# Patient Record
Sex: MALE | Race: WHITE | NOT HISPANIC OR LATINO | Employment: FULL TIME | URBAN - METROPOLITAN AREA
[De-identification: names, ages, dates, MRNs, and addresses within clinical notes are randomized per-mention and may not be internally consistent; named-entity substitution may affect disease eponyms.]

---

## 2017-01-11 ENCOUNTER — TRANSCRIBE ORDERS (OUTPATIENT)
Dept: ADMINISTRATIVE | Facility: HOSPITAL | Age: 32
End: 2017-01-11

## 2017-01-11 DIAGNOSIS — G47.33 OBSTRUCTIVE SLEEP APNEA (ADULT) (PEDIATRIC): Primary | ICD-10-CM

## 2017-01-25 ENCOUNTER — HOSPITAL ENCOUNTER (OUTPATIENT)
Dept: SLEEP CENTER | Facility: CLINIC | Age: 32
Discharge: HOME | End: 2017-01-25
Payer: COMMERCIAL

## 2017-01-25 DIAGNOSIS — G47.33 OBSTRUCTIVE SLEEP APNEA (ADULT) (PEDIATRIC): ICD-10-CM

## 2017-01-25 PROCEDURE — 95810 POLYSOM 6/> YRS 4/> PARAM: CPT

## 2017-01-31 ENCOUNTER — TRANSCRIBE ORDERS (OUTPATIENT)
Dept: SLEEP CENTER | Facility: CLINIC | Age: 32
End: 2017-01-31

## 2017-01-31 DIAGNOSIS — G47.33 OSA (OBSTRUCTIVE SLEEP APNEA): Primary | ICD-10-CM

## 2017-02-17 ENCOUNTER — TRANSCRIBE ORDERS (OUTPATIENT)
Dept: SLEEP CENTER | Facility: CLINIC | Age: 32
End: 2017-02-17

## 2017-02-17 DIAGNOSIS — G47.33 OSA (OBSTRUCTIVE SLEEP APNEA): Primary | ICD-10-CM

## 2017-03-03 ENCOUNTER — GENERIC CONVERSION - ENCOUNTER (OUTPATIENT)
Dept: OTHER | Facility: OTHER | Age: 32
End: 2017-03-03

## 2017-03-06 ENCOUNTER — TRANSCRIBE ORDERS (OUTPATIENT)
Dept: SLEEP CENTER | Facility: CLINIC | Age: 32
End: 2017-03-06

## 2017-03-06 ENCOUNTER — HOSPITAL ENCOUNTER (OUTPATIENT)
Dept: SLEEP CENTER | Facility: CLINIC | Age: 32
Discharge: HOME | End: 2017-03-06
Payer: COMMERCIAL

## 2017-03-06 DIAGNOSIS — G47.33 OBSTRUCTIVE SLEEP APNEA (ADULT) (PEDIATRIC): Primary | ICD-10-CM

## 2017-03-06 DIAGNOSIS — G47.33 OSA (OBSTRUCTIVE SLEEP APNEA): ICD-10-CM

## 2017-05-08 ENCOUNTER — TRANSCRIBE ORDERS (OUTPATIENT)
Dept: SLEEP CENTER | Facility: CLINIC | Age: 32
End: 2017-05-08

## 2017-05-08 ENCOUNTER — HOSPITAL ENCOUNTER (OUTPATIENT)
Dept: SLEEP CENTER | Facility: CLINIC | Age: 32
Discharge: HOME | End: 2017-05-08
Payer: COMMERCIAL

## 2017-05-08 DIAGNOSIS — G47.33 OSA (OBSTRUCTIVE SLEEP APNEA): Primary | ICD-10-CM

## 2017-05-08 DIAGNOSIS — G47.33 OBSTRUCTIVE SLEEP APNEA (ADULT) (PEDIATRIC): ICD-10-CM

## 2017-05-18 ENCOUNTER — ALLSCRIPTS OFFICE VISIT (OUTPATIENT)
Dept: OTHER | Facility: OTHER | Age: 32
End: 2017-05-18

## 2017-05-18 DIAGNOSIS — E78.00 PURE HYPERCHOLESTEROLEMIA: ICD-10-CM

## 2017-05-18 DIAGNOSIS — Z13.1 ENCOUNTER FOR SCREENING FOR DIABETES MELLITUS: ICD-10-CM

## 2017-06-07 ENCOUNTER — GENERIC CONVERSION - ENCOUNTER (OUTPATIENT)
Dept: OTHER | Facility: OTHER | Age: 32
End: 2017-06-07

## 2017-06-07 LAB — AMBIG ABBREV LP DEFAULT (HISTORICAL): NORMAL

## 2017-06-08 LAB
BUN SERPL-MCNC: 13 MG/DL (ref 6–20)
BUN/CREA RATIO (HISTORICAL): 12 (ref 9–20)
CALCIUM SERPL-MCNC: 9.8 MG/DL (ref 8.7–10.2)
CHLORIDE SERPL-SCNC: 96 MMOL/L (ref 96–106)
CHOLEST SERPL-MCNC: 194 MG/DL (ref 100–199)
CO2 SERPL-SCNC: 24 MMOL/L (ref 18–29)
CREAT SERPL-MCNC: 1.07 MG/DL (ref 0.76–1.27)
EGFR AFRICAN AMERICAN (HISTORICAL): 106 ML/MIN/1.73
EGFR-AMERICAN CALC (HISTORICAL): 91 ML/MIN/1.73
GLUCOSE SERPL-MCNC: 95 MG/DL (ref 65–99)
HDLC SERPL-MCNC: 36 MG/DL
LDLC SERPL CALC-MCNC: 101 MG/DL (ref 0–99)
POTASSIUM SERPL-SCNC: 4.9 MMOL/L (ref 3.5–5.2)
SODIUM SERPL-SCNC: 138 MMOL/L (ref 134–144)
TRIGL SERPL-MCNC: 286 MG/DL (ref 0–149)
VLDLC SERPL CALC-MCNC: 57 MG/DL (ref 5–40)

## 2018-01-14 VITALS
DIASTOLIC BLOOD PRESSURE: 76 MMHG | WEIGHT: 236.38 LBS | HEIGHT: 69 IN | OXYGEN SATURATION: 97 % | BODY MASS INDEX: 35.01 KG/M2 | HEART RATE: 78 BPM | SYSTOLIC BLOOD PRESSURE: 124 MMHG | TEMPERATURE: 98.1 F

## 2018-01-26 DIAGNOSIS — F32.A DEPRESSION, UNSPECIFIED DEPRESSION TYPE: Primary | ICD-10-CM

## 2018-01-26 RX ORDER — ESCITALOPRAM OXALATE 10 MG/1
TABLET ORAL
COMMUNITY
Start: 2017-05-18 | End: 2018-01-26 | Stop reason: SDUPTHER

## 2018-01-26 RX ORDER — ESCITALOPRAM OXALATE 10 MG/1
10 TABLET ORAL DAILY
Qty: 90 TABLET | Refills: 1 | Status: SHIPPED | OUTPATIENT
Start: 2018-01-26 | End: 2018-08-19 | Stop reason: SDUPTHER

## 2018-02-06 ENCOUNTER — TELEPHONE (OUTPATIENT)
Dept: FAMILY MEDICINE CLINIC | Facility: CLINIC | Age: 33
End: 2018-02-06

## 2018-02-06 NOTE — TELEPHONE ENCOUNTER
Pharmacy called for clarification on his Lexapro  One daily or Two daily?  Send new rx to [facility]

## 2018-02-06 NOTE — TELEPHONE ENCOUNTER
Lexapro 10 mg po once daily for 1 week then 2 tablets (20 mg) po once daily advise patient and or pharmacy

## 2018-02-08 ENCOUNTER — ANESTHESIA (INPATIENT)
Dept: PERIOP | Facility: HOSPITAL | Age: 33
DRG: 357 | End: 2018-02-08
Payer: COMMERCIAL

## 2018-02-08 ENCOUNTER — ANESTHESIA EVENT (INPATIENT)
Dept: PERIOP | Facility: HOSPITAL | Age: 33
DRG: 357 | End: 2018-02-08
Payer: COMMERCIAL

## 2018-02-08 ENCOUNTER — HOSPITAL ENCOUNTER (INPATIENT)
Facility: HOSPITAL | Age: 33
LOS: 4 days | Discharge: HOME | DRG: 357 | End: 2018-02-12
Attending: EMERGENCY MEDICINE | Admitting: SURGERY
Payer: COMMERCIAL

## 2018-02-08 ENCOUNTER — APPOINTMENT (EMERGENCY)
Dept: CT IMAGING | Facility: HOSPITAL | Age: 33
DRG: 357 | End: 2018-02-08
Payer: COMMERCIAL

## 2018-02-08 DIAGNOSIS — K45.8 INTERNAL HERNIA: ICD-10-CM

## 2018-02-08 DIAGNOSIS — D72.829 LEUKOCYTOSIS: ICD-10-CM

## 2018-02-08 DIAGNOSIS — R10.33 PERIUMBILICAL ABDOMINAL PAIN: Primary | ICD-10-CM

## 2018-02-08 DIAGNOSIS — K56.600 PARTIAL SMALL BOWEL OBSTRUCTION (HCC): ICD-10-CM

## 2018-02-08 DIAGNOSIS — R11.0 NAUSEA: ICD-10-CM

## 2018-02-08 LAB
ALBUMIN SERPL BCP-MCNC: 4.3 G/DL (ref 3.5–5)
ALP SERPL-CCNC: 69 U/L (ref 46–116)
ALT SERPL W P-5'-P-CCNC: 53 U/L (ref 12–78)
ANION GAP SERPL CALCULATED.3IONS-SCNC: 10 MMOL/L (ref 4–13)
AST SERPL W P-5'-P-CCNC: 24 U/L (ref 5–45)
BASOPHILS # BLD AUTO: 0.07 THOUSANDS/ΜL (ref 0–0.1)
BASOPHILS NFR BLD AUTO: 1 % (ref 0–1)
BILIRUB SERPL-MCNC: 0.5 MG/DL (ref 0.2–1)
BILIRUB UR QL STRIP: NEGATIVE
BUN SERPL-MCNC: 14 MG/DL (ref 5–25)
CALCIUM SERPL-MCNC: 10.1 MG/DL (ref 8.3–10.1)
CHLORIDE SERPL-SCNC: 100 MMOL/L (ref 100–108)
CLARITY UR: CLEAR
CO2 SERPL-SCNC: 28 MMOL/L (ref 21–32)
COLOR UR: YELLOW
CREAT SERPL-MCNC: 1.28 MG/DL (ref 0.6–1.3)
EOSINOPHIL # BLD AUTO: 0.16 THOUSAND/ΜL (ref 0–0.61)
EOSINOPHIL NFR BLD AUTO: 1 % (ref 0–6)
ERYTHROCYTE [DISTWIDTH] IN BLOOD BY AUTOMATED COUNT: 12.1 % (ref 11.6–15.1)
GFR SERPL CREATININE-BSD FRML MDRD: 73 ML/MIN/1.73SQ M
GLUCOSE SERPL-MCNC: 112 MG/DL (ref 65–140)
GLUCOSE UR STRIP-MCNC: NEGATIVE MG/DL
HCT VFR BLD AUTO: 51.8 % (ref 36.5–49.3)
HGB BLD-MCNC: 17.5 G/DL (ref 12–17)
HGB UR QL STRIP.AUTO: NEGATIVE
KETONES UR STRIP-MCNC: NEGATIVE MG/DL
LEUKOCYTE ESTERASE UR QL STRIP: NEGATIVE
LIPASE SERPL-CCNC: 76 U/L (ref 73–393)
LYMPHOCYTES # BLD AUTO: 1.44 THOUSANDS/ΜL (ref 0.6–4.47)
LYMPHOCYTES NFR BLD AUTO: 10 % (ref 14–44)
MCH RBC QN AUTO: 28.9 PG (ref 26.8–34.3)
MCHC RBC AUTO-ENTMCNC: 33.8 G/DL (ref 31.4–37.4)
MCV RBC AUTO: 86 FL (ref 82–98)
MONOCYTES # BLD AUTO: 0.69 THOUSAND/ΜL (ref 0.17–1.22)
MONOCYTES NFR BLD AUTO: 5 % (ref 4–12)
NEUTROPHILS # BLD AUTO: 12.19 THOUSANDS/ΜL (ref 1.85–7.62)
NEUTS SEG NFR BLD AUTO: 84 % (ref 43–75)
NITRITE UR QL STRIP: NEGATIVE
NRBC BLD AUTO-RTO: 0 /100 WBCS
PH UR STRIP.AUTO: 6 [PH] (ref 4.5–8)
PLATELET # BLD AUTO: 279 THOUSANDS/UL (ref 149–390)
PMV BLD AUTO: 9.7 FL (ref 8.9–12.7)
POTASSIUM SERPL-SCNC: 4.9 MMOL/L (ref 3.5–5.3)
PROT SERPL-MCNC: 8.7 G/DL (ref 6.4–8.2)
PROT UR STRIP-MCNC: NEGATIVE MG/DL
RBC # BLD AUTO: 6.06 MILLION/UL (ref 3.88–5.62)
SODIUM SERPL-SCNC: 138 MMOL/L (ref 136–145)
SP GR UR STRIP.AUTO: <=1.005 (ref 1–1.03)
UROBILINOGEN UR QL STRIP.AUTO: 0.2 E.U./DL
WBC # BLD AUTO: 14.6 THOUSAND/UL (ref 4.31–10.16)

## 2018-02-08 PROCEDURE — 83690 ASSAY OF LIPASE: CPT | Performed by: EMERGENCY MEDICINE

## 2018-02-08 PROCEDURE — 94660 CPAP INITIATION&MGMT: CPT

## 2018-02-08 PROCEDURE — 80053 COMPREHEN METABOLIC PANEL: CPT | Performed by: EMERGENCY MEDICINE

## 2018-02-08 PROCEDURE — 96361 HYDRATE IV INFUSION ADD-ON: CPT

## 2018-02-08 PROCEDURE — 0WJP0ZZ INSPECTION OF GASTROINTESTINAL TRACT, OPEN APPROACH: ICD-10-PCS | Performed by: SURGERY

## 2018-02-08 PROCEDURE — 94760 N-INVAS EAR/PLS OXIMETRY 1: CPT

## 2018-02-08 PROCEDURE — C9113 INJ PANTOPRAZOLE SODIUM, VIA: HCPCS | Performed by: EMERGENCY MEDICINE

## 2018-02-08 PROCEDURE — 3E0M05Z INTRODUCTION OF ADHESION BARRIER INTO PERITONEAL CAVITY, OPEN APPROACH: ICD-10-PCS | Performed by: SURGERY

## 2018-02-08 PROCEDURE — 96375 TX/PRO/DX INJ NEW DRUG ADDON: CPT

## 2018-02-08 PROCEDURE — 96374 THER/PROPH/DIAG INJ IV PUSH: CPT

## 2018-02-08 PROCEDURE — 99222 1ST HOSP IP/OBS MODERATE 55: CPT | Performed by: SURGERY

## 2018-02-08 PROCEDURE — 99285 EMERGENCY DEPT VISIT HI MDM: CPT

## 2018-02-08 PROCEDURE — 87075 CULTR BACTERIA EXCEPT BLOOD: CPT | Performed by: SURGERY

## 2018-02-08 PROCEDURE — 87205 SMEAR GRAM STAIN: CPT | Performed by: SURGERY

## 2018-02-08 PROCEDURE — 87070 CULTURE OTHR SPECIMN AEROBIC: CPT | Performed by: SURGERY

## 2018-02-08 PROCEDURE — 74177 CT ABD & PELVIS W/CONTRAST: CPT

## 2018-02-08 PROCEDURE — 85025 COMPLETE CBC W/AUTO DIFF WBC: CPT | Performed by: EMERGENCY MEDICINE

## 2018-02-08 PROCEDURE — 49000 EXPLORATION OF ABDOMEN: CPT | Performed by: PHYSICIAN ASSISTANT

## 2018-02-08 PROCEDURE — C1765 ADHESION BARRIER: HCPCS | Performed by: SURGERY

## 2018-02-08 PROCEDURE — 49000 EXPLORATION OF ABDOMEN: CPT | Performed by: SURGERY

## 2018-02-08 PROCEDURE — 36415 COLL VENOUS BLD VENIPUNCTURE: CPT | Performed by: EMERGENCY MEDICINE

## 2018-02-08 PROCEDURE — 81003 URINALYSIS AUTO W/O SCOPE: CPT | Performed by: EMERGENCY MEDICINE

## 2018-02-08 RX ORDER — FENTANYL CITRATE 50 UG/ML
INJECTION, SOLUTION INTRAMUSCULAR; INTRAVENOUS AS NEEDED
Status: DISCONTINUED | OUTPATIENT
Start: 2018-02-08 | End: 2018-02-08 | Stop reason: SURG

## 2018-02-08 RX ORDER — DEXTROSE, SODIUM CHLORIDE, AND POTASSIUM CHLORIDE 5; .9; .15 G/100ML; G/100ML; G/100ML
100 INJECTION INTRAVENOUS CONTINUOUS
Status: DISCONTINUED | OUTPATIENT
Start: 2018-02-08 | End: 2018-02-08

## 2018-02-08 RX ORDER — ROCURONIUM BROMIDE 10 MG/ML
INJECTION, SOLUTION INTRAVENOUS AS NEEDED
Status: DISCONTINUED | OUTPATIENT
Start: 2018-02-08 | End: 2018-02-08 | Stop reason: SURG

## 2018-02-08 RX ORDER — SODIUM CHLORIDE, SODIUM LACTATE, POTASSIUM CHLORIDE, CALCIUM CHLORIDE 600; 310; 30; 20 MG/100ML; MG/100ML; MG/100ML; MG/100ML
50 INJECTION, SOLUTION INTRAVENOUS CONTINUOUS
Status: DISCONTINUED | OUTPATIENT
Start: 2018-02-08 | End: 2018-02-08

## 2018-02-08 RX ORDER — PANTOPRAZOLE SODIUM 40 MG/1
40 INJECTION, POWDER, FOR SOLUTION INTRAVENOUS ONCE
Status: COMPLETED | OUTPATIENT
Start: 2018-02-08 | End: 2018-02-08

## 2018-02-08 RX ORDER — HYDROCODONE BITARTRATE AND ACETAMINOPHEN 5; 325 MG/1; MG/1
1 TABLET ORAL EVERY 4 HOURS PRN
Status: DISCONTINUED | OUTPATIENT
Start: 2018-02-08 | End: 2018-02-12 | Stop reason: HOSPADM

## 2018-02-08 RX ORDER — LIDOCAINE HYDROCHLORIDE 10 MG/ML
INJECTION, SOLUTION INFILTRATION; PERINEURAL AS NEEDED
Status: DISCONTINUED | OUTPATIENT
Start: 2018-02-08 | End: 2018-02-08 | Stop reason: SURG

## 2018-02-08 RX ORDER — FLUTICASONE PROPIONATE 50 MCG
1 SPRAY, SUSPENSION (ML) NASAL DAILY
COMMUNITY
Start: 2016-12-15

## 2018-02-08 RX ORDER — HYDROMORPHONE HYDROCHLORIDE 2 MG/ML
INJECTION, SOLUTION INTRAMUSCULAR; INTRAVENOUS; SUBCUTANEOUS AS NEEDED
Status: DISCONTINUED | OUTPATIENT
Start: 2018-02-08 | End: 2018-02-08 | Stop reason: SURG

## 2018-02-08 RX ORDER — SODIUM CHLORIDE 9 MG/ML
INJECTION, SOLUTION INTRAVENOUS CONTINUOUS PRN
Status: DISCONTINUED | OUTPATIENT
Start: 2018-02-08 | End: 2018-02-08 | Stop reason: SURG

## 2018-02-08 RX ORDER — SUCCINYLCHOLINE CHLORIDE 20 MG/ML
INJECTION INTRAMUSCULAR; INTRAVENOUS AS NEEDED
Status: DISCONTINUED | OUTPATIENT
Start: 2018-02-08 | End: 2018-02-08 | Stop reason: SURG

## 2018-02-08 RX ORDER — DEXMEDETOMIDINE HYDROCHLORIDE 100 UG/ML
INJECTION, SOLUTION INTRAVENOUS AS NEEDED
Status: DISCONTINUED | OUTPATIENT
Start: 2018-02-08 | End: 2018-02-08 | Stop reason: SURG

## 2018-02-08 RX ORDER — ONDANSETRON 2 MG/ML
4 INJECTION INTRAMUSCULAR; INTRAVENOUS ONCE AS NEEDED
Status: DISCONTINUED | OUTPATIENT
Start: 2018-02-08 | End: 2018-02-08 | Stop reason: HOSPADM

## 2018-02-08 RX ORDER — MAGNESIUM HYDROXIDE/ALUMINUM HYDROXICE/SIMETHICONE 120; 1200; 1200 MG/30ML; MG/30ML; MG/30ML
30 SUSPENSION ORAL ONCE
Status: COMPLETED | OUTPATIENT
Start: 2018-02-08 | End: 2018-02-08

## 2018-02-08 RX ORDER — ONDANSETRON 2 MG/ML
4 INJECTION INTRAMUSCULAR; INTRAVENOUS ONCE
Status: COMPLETED | OUTPATIENT
Start: 2018-02-08 | End: 2018-02-08

## 2018-02-08 RX ORDER — FENTANYL CITRATE/PF 50 MCG/ML
25 SYRINGE (ML) INJECTION AS NEEDED
Status: COMPLETED | OUTPATIENT
Start: 2018-02-08 | End: 2018-02-08

## 2018-02-08 RX ORDER — PSEUDOEPHEDRINE HCL 120 MG/1
1 TABLET, FILM COATED, EXTENDED RELEASE ORAL EVERY 12 HOURS PRN
COMMUNITY
Start: 2016-12-15

## 2018-02-08 RX ORDER — PROPOFOL 10 MG/ML
INJECTION, EMULSION INTRAVENOUS AS NEEDED
Status: DISCONTINUED | OUTPATIENT
Start: 2018-02-08 | End: 2018-02-08 | Stop reason: SURG

## 2018-02-08 RX ORDER — SODIUM CHLORIDE, SODIUM LACTATE, POTASSIUM CHLORIDE, CALCIUM CHLORIDE 600; 310; 30; 20 MG/100ML; MG/100ML; MG/100ML; MG/100ML
200 INJECTION, SOLUTION INTRAVENOUS CONTINUOUS
Status: DISCONTINUED | OUTPATIENT
Start: 2018-02-08 | End: 2018-02-08

## 2018-02-08 RX ORDER — AMLODIPINE AND VALSARTAN 5; 320 MG/1; MG/1
1 TABLET ORAL DAILY
COMMUNITY
Start: 2014-11-17 | End: 2018-08-19 | Stop reason: SDUPTHER

## 2018-02-08 RX ORDER — ALBUTEROL SULFATE 2.5 MG/3ML
2.5 SOLUTION RESPIRATORY (INHALATION) ONCE AS NEEDED
Status: DISCONTINUED | OUTPATIENT
Start: 2018-02-08 | End: 2018-02-08 | Stop reason: HOSPADM

## 2018-02-08 RX ORDER — ONDANSETRON 2 MG/ML
INJECTION INTRAMUSCULAR; INTRAVENOUS AS NEEDED
Status: DISCONTINUED | OUTPATIENT
Start: 2018-02-08 | End: 2018-02-08 | Stop reason: SURG

## 2018-02-08 RX ORDER — DEXTROSE AND SODIUM CHLORIDE 5; .45 G/100ML; G/100ML
100 INJECTION, SOLUTION INTRAVENOUS CONTINUOUS
Status: DISCONTINUED | OUTPATIENT
Start: 2018-02-08 | End: 2018-02-10

## 2018-02-08 RX ORDER — ONDANSETRON 2 MG/ML
4 INJECTION INTRAMUSCULAR; INTRAVENOUS EVERY 4 HOURS PRN
Status: DISCONTINUED | OUTPATIENT
Start: 2018-02-08 | End: 2018-02-08 | Stop reason: ALTCHOICE

## 2018-02-08 RX ORDER — METOCLOPRAMIDE HYDROCHLORIDE 5 MG/ML
10 INJECTION INTRAMUSCULAR; INTRAVENOUS ONCE AS NEEDED
Status: DISCONTINUED | OUTPATIENT
Start: 2018-02-08 | End: 2018-02-08 | Stop reason: HOSPADM

## 2018-02-08 RX ORDER — ONDANSETRON 2 MG/ML
4 INJECTION INTRAMUSCULAR; INTRAVENOUS EVERY 6 HOURS PRN
Status: DISCONTINUED | OUTPATIENT
Start: 2018-02-08 | End: 2018-02-12 | Stop reason: HOSPADM

## 2018-02-08 RX ORDER — MIDAZOLAM HYDROCHLORIDE 1 MG/ML
INJECTION INTRAMUSCULAR; INTRAVENOUS AS NEEDED
Status: DISCONTINUED | OUTPATIENT
Start: 2018-02-08 | End: 2018-02-08 | Stop reason: SURG

## 2018-02-08 RX ORDER — ROSUVASTATIN CALCIUM 10 MG/1
1 TABLET, COATED ORAL
COMMUNITY
Start: 2015-05-05

## 2018-02-08 RX ORDER — PROMETHAZINE HYDROCHLORIDE 25 MG/ML
12.5 INJECTION, SOLUTION INTRAMUSCULAR; INTRAVENOUS ONCE AS NEEDED
Status: DISCONTINUED | OUTPATIENT
Start: 2018-02-08 | End: 2018-02-08 | Stop reason: HOSPADM

## 2018-02-08 RX ORDER — SODIUM CHLORIDE 9 MG/ML
125 INJECTION, SOLUTION INTRAVENOUS CONTINUOUS
Status: DISCONTINUED | OUTPATIENT
Start: 2018-02-08 | End: 2018-02-09

## 2018-02-08 RX ADMIN — SUCCINYLCHOLINE CHLORIDE 140 MG: 20 INJECTION, SOLUTION INTRAMUSCULAR; INTRAVENOUS at 13:52

## 2018-02-08 RX ADMIN — HYDROCODONE BITARTRATE AND ACETAMINOPHEN 1 TABLET: 5; 325 TABLET ORAL at 22:13

## 2018-02-08 RX ADMIN — SODIUM CHLORIDE, SODIUM LACTATE, POTASSIUM CHLORIDE, AND CALCIUM CHLORIDE 200 ML/HR: .6; .31; .03; .02 INJECTION, SOLUTION INTRAVENOUS at 12:50

## 2018-02-08 RX ADMIN — ROCURONIUM BROMIDE 30 MG: 10 INJECTION INTRAVENOUS at 13:58

## 2018-02-08 RX ADMIN — CEFAZOLIN SODIUM 2000 MG: 2 SOLUTION INTRAVENOUS at 13:51

## 2018-02-08 RX ADMIN — SODIUM CHLORIDE: 0.9 INJECTION, SOLUTION INTRAVENOUS at 13:57

## 2018-02-08 RX ADMIN — DEXTROSE AND SODIUM CHLORIDE 100 ML/HR: 5; 450 INJECTION, SOLUTION INTRAVENOUS at 16:14

## 2018-02-08 RX ADMIN — DEXMEDETOMIDINE 12 MCG: 100 INJECTION, SOLUTION, CONCENTRATE INTRAVENOUS at 14:00

## 2018-02-08 RX ADMIN — LIDOCAINE HYDROCHLORIDE 10 ML: 20 SOLUTION ORAL; TOPICAL at 09:54

## 2018-02-08 RX ADMIN — HYDROMORPHONE HYDROCHLORIDE 0.6 MG: 2 INJECTION, SOLUTION INTRAMUSCULAR; INTRAVENOUS; SUBCUTANEOUS at 14:12

## 2018-02-08 RX ADMIN — HYDROMORPHONE HYDROCHLORIDE 0.6 MG: 2 INJECTION, SOLUTION INTRAMUSCULAR; INTRAVENOUS; SUBCUTANEOUS at 15:04

## 2018-02-08 RX ADMIN — SODIUM CHLORIDE, SODIUM LACTATE, POTASSIUM CHLORIDE, AND CALCIUM CHLORIDE 200 ML/HR: .6; .31; .03; .02 INJECTION, SOLUTION INTRAVENOUS at 12:41

## 2018-02-08 RX ADMIN — MIDAZOLAM 2 MG: 1 INJECTION INTRAMUSCULAR; INTRAVENOUS at 13:48

## 2018-02-08 RX ADMIN — PROPOFOL 200 MG: 10 INJECTION, EMULSION INTRAVENOUS at 13:52

## 2018-02-08 RX ADMIN — HYDROMORPHONE HYDROCHLORIDE 1 MG: 1 INJECTION, SOLUTION INTRAMUSCULAR; INTRAVENOUS; SUBCUTANEOUS at 10:48

## 2018-02-08 RX ADMIN — SUGAMMADEX 250 MG: 100 INJECTION, SOLUTION INTRAVENOUS at 14:37

## 2018-02-08 RX ADMIN — HYDROMORPHONE HYDROCHLORIDE 0.4 MG: 1 INJECTION, SOLUTION INTRAMUSCULAR; INTRAVENOUS; SUBCUTANEOUS at 15:56

## 2018-02-08 RX ADMIN — METRONIDAZOLE 500 MG: 500 INJECTION, SOLUTION INTRAVENOUS at 13:55

## 2018-02-08 RX ADMIN — PANTOPRAZOLE SODIUM 40 MG: 40 INJECTION, POWDER, FOR SOLUTION INTRAVENOUS at 09:52

## 2018-02-08 RX ADMIN — SODIUM CHLORIDE 1000 ML: 0.9 INJECTION, SOLUTION INTRAVENOUS at 09:50

## 2018-02-08 RX ADMIN — HYDROMORPHONE HYDROCHLORIDE 1 MG: 1 INJECTION, SOLUTION INTRAMUSCULAR; INTRAVENOUS; SUBCUTANEOUS at 19:41

## 2018-02-08 RX ADMIN — HYDROMORPHONE HYDROCHLORIDE 0.4 MG: 2 INJECTION, SOLUTION INTRAMUSCULAR; INTRAVENOUS; SUBCUTANEOUS at 15:09

## 2018-02-08 RX ADMIN — FENTANYL CITRATE 25 MCG: 50 INJECTION INTRAMUSCULAR; INTRAVENOUS at 15:30

## 2018-02-08 RX ADMIN — DEXAMETHASONE SODIUM PHOSPHATE 10 MG: 10 INJECTION INTRAMUSCULAR; INTRAVENOUS at 13:54

## 2018-02-08 RX ADMIN — IOHEXOL 100 ML: 350 INJECTION, SOLUTION INTRAVENOUS at 10:26

## 2018-02-08 RX ADMIN — HYDROMORPHONE HYDROCHLORIDE 0.4 MG: 1 INJECTION, SOLUTION INTRAMUSCULAR; INTRAVENOUS; SUBCUTANEOUS at 16:06

## 2018-02-08 RX ADMIN — DEXMEDETOMIDINE 12 MCG: 100 INJECTION, SOLUTION, CONCENTRATE INTRAVENOUS at 13:52

## 2018-02-08 RX ADMIN — SODIUM CHLORIDE, SODIUM LACTATE, POTASSIUM CHLORIDE, AND CALCIUM CHLORIDE: .6; .31; .03; .02 INJECTION, SOLUTION INTRAVENOUS at 13:45

## 2018-02-08 RX ADMIN — ONDANSETRON 4 MG: 2 INJECTION INTRAMUSCULAR; INTRAVENOUS at 14:16

## 2018-02-08 RX ADMIN — FENTANYL CITRATE 100 MCG: 50 INJECTION, SOLUTION INTRAMUSCULAR; INTRAVENOUS at 13:50

## 2018-02-08 RX ADMIN — SODIUM CHLORIDE 125 ML/HR: 0.9 INJECTION, SOLUTION INTRAVENOUS at 21:22

## 2018-02-08 RX ADMIN — FENTANYL CITRATE 25 MCG: 50 INJECTION INTRAMUSCULAR; INTRAVENOUS at 15:49

## 2018-02-08 RX ADMIN — ONDANSETRON 4 MG: 2 INJECTION INTRAMUSCULAR; INTRAVENOUS at 09:54

## 2018-02-08 RX ADMIN — LIDOCAINE HYDROCHLORIDE 50 MG: 10 INJECTION, SOLUTION INFILTRATION; PERINEURAL at 13:52

## 2018-02-08 RX ADMIN — HYDROMORPHONE HYDROCHLORIDE 1 MG: 1 INJECTION, SOLUTION INTRAMUSCULAR; INTRAVENOUS; SUBCUTANEOUS at 12:51

## 2018-02-08 RX ADMIN — ALUMINUM HYDROXIDE, MAGNESIUM HYDROXIDE, AND SIMETHICONE 30 ML: 200; 200; 20 SUSPENSION ORAL at 09:54

## 2018-02-08 RX ADMIN — FENTANYL CITRATE 25 MCG: 50 INJECTION INTRAMUSCULAR; INTRAVENOUS at 15:35

## 2018-02-08 RX ADMIN — FENTANYL CITRATE 25 MCG: 50 INJECTION INTRAMUSCULAR; INTRAVENOUS at 15:43

## 2018-02-08 NOTE — ANESTHESIA PREPROCEDURE EVALUATION
Review of Systems/Medical History  Patient summary reviewed  Chart reviewed  No history of anesthetic complications     Cardiovascular  EKG reviewed, Exercise tolerance: good,  Hyperlipidemia, Hypertension ,    Pulmonary  Negative pulmonary ROS        GI/Hepatic      Comment: Partial small bowel obstruction        IMPRESSION:     Findings most consistent with early or partial small bowel obstruction with sharp transition from moderately distended to collapsed loops to the left of midline in the mid to lower abdomen  The obstruction appears to be on the basis of internal hernia  Small volume of reactive interloop ascites and free pelvic ascites with no evidence of abscess or clement peritoneal air    Negative  ROS        Endo/Other  Negative endo/other ROS      GYN  Negative gynecology ROS          Hematology  Negative hematology ROS      Musculoskeletal  Negative musculoskeletal ROS        Neurology  Negative neurology ROS      Psychology   Negative psychology ROS              Physical Exam    Airway    Mallampati score: II  TM Distance: >3 FB  Neck ROM: full     Dental   No notable dental hx     Cardiovascular  Rhythm: regular, Rate: normal, Cardiovascular exam normal    Pulmonary  Pulmonary exam normal Breath sounds clear to auscultation,     Other Findings        Anesthesia Plan  ASA Score- 2 Emergent    Anesthesia Type- general with ASA Monitors  Additional Monitors:   Airway Plan: ETT  Plan Factors-Patient not instructed to abstain from smoking on day of procedure  Patient smoked on day of surgery  Induction- intravenous and rapid sequence induction  Postoperative Plan- Plan for postoperative opioid use  Planned trial extubation    Informed Consent- Anesthetic plan and risks discussed with patient  I personally reviewed this patient with the CRNA  Discussed and agreed on the Anesthesia Plan with the CRNA             Lab Results   Component Value Date    WBC 14 60 (H) 02/08/2018    HGB 17 5 (H) 02/08/2018    HCT 51 8 (H) 02/08/2018    MCV 86 02/08/2018     02/08/2018     Lab Results   Component Value Date    GLUCOSE 112 02/08/2018    CALCIUM 10 1 02/08/2018     02/08/2018    K 4 9 02/08/2018    CO2 28 02/08/2018     02/08/2018    BUN 14 02/08/2018    CREATININE 1 28 02/08/2018     No results found for: INR, PROTIME  No results found for: PTT          I, Dr Zee Allen, the attending physician, have personally seen and evaluated the patient prior to anesthetic care  I have reviewed the pre-anesthetic record, and other medical records if appropriate to the anesthetic care  If a CRNA is involved in the case, I have reviewed the CRNA assessment, if present, and agree  The patient is in a suitable condition to proceed with my formulated anesthetic plan

## 2018-02-08 NOTE — H&P
H&P Exam - General Surgery   Jackie Saucedo 34 y o  male MRN: [id number]  Unit/Bed#: ED 12 Encounter: [id number]    Assessment/Plan     Assessment:  Abdominal pain with SBO and leucocytosis      Plan:  IVF with antibiotic coverage  Pain relief  Exploratory laparoscopy    History of Present Illness     HPI:  Jackie Saucedo is a 34 y o  male who presents with 24 hr HX of abdominal pain and some chills, no emesis, no previous SX as such, and no previous abdominal surgery  Last BM last night, no diarrhea, NPO since last night  Pain is diffusely periumbilical and deep with feelings of pressure and sharp pain  PMH remarkable for no abdominal surgery, HTN hyperlipidemia, and depression  CT reveals upper/mid SBO suspect for internal hernia  WBC 14 6K  Review of Systems   Constitutional: Positive for chills  Negative for appetite change, fatigue, fever and unexpected weight change  HENT: Negative  Eyes: Negative  Respiratory: Negative  Cardiovascular:        HTN   Gastrointestinal: Negative  Endocrine: Negative  Genitourinary: Negative  Musculoskeletal: Negative  Allergic/Immunologic: Negative  Neurological: Negative  Psychiatric/Behavioral:        Treated for mild depression   All other systems reviewed and are negative        Historical Information   Past Medical History:   Diagnosis Date    Hypertension      Past Surgical History:   Procedure Laterality Date    LEG SURGERY       Social History   History   Alcohol Use    Yes     Comment: Social     History   Drug Use No     History   Smoking Status    Never Smoker   Smokeless Tobacco    Never Used     Family History: non-contributory    Meds/Allergies   all medications and allergies reviewed  No Known Allergies    Objective   First Vitals:   Blood Pressure: 140/95 (02/08/18 0940)  Pulse: 95 (02/08/18 0940)  Temperature: 99 °F (37 2 °C) (02/08/18 0940)  Temp Source: Oral (02/08/18 0940)  Respirations: 16 (02/08/18 0940)  Height: 5' 9" (175 3 cm) (02/08/18 0940)  Weight - Scale: 112 kg (246 lb 14 6 oz) (02/08/18 0940)  SpO2: 98 % (02/08/18 0940)    Current Vitals:   Blood Pressure: 142/90 (02/08/18 1152)  Pulse: 91 (02/08/18 1152)  Temperature: 99 °F (37 2 °C) (02/08/18 0940)  Temp Source: Oral (02/08/18 0940)  Respirations: 16 (02/08/18 1152)  Height: 5' 9" (175 3 cm) (02/08/18 0940)  Weight - Scale: 112 kg (246 lb 14 6 oz) (02/08/18 0940)  SpO2: 98 % (02/08/18 1152)      Intake/Output Summary (Last 24 hours) at 02/08/18 1155  Last data filed at 02/08/18 1050   Gross per 24 hour   Intake             2000 ml   Output                0 ml   Net             2000 ml       Invasive Devices     Peripheral Intravenous Line            Peripheral IV 02/08/18 Left Antecubital less than 1 day                Physical Exam   Constitutional: He is oriented to person, place, and time  He appears well-developed and well-nourished  HENT:   Head: Normocephalic and atraumatic  Mouth/Throat: Oropharynx is clear and moist    Eyes: Conjunctivae and EOM are normal  Pupils are equal, round, and reactive to light  Neck: Normal range of motion  Neck supple  No JVD present  No tracheal deviation present  No thyromegaly present  Cardiovascular: Normal rate, regular rhythm and normal heart sounds  Pulmonary/Chest: [facility] normal and breath sounds normal  No respiratory distress  He has no wheezes  He has no rales  Abdominal:   Moderately distended, soft, diffusely tender in nonspecific manner through mid abdomen, BS are 2/min and of obstructive quality, no herniations identified   Musculoskeletal: Normal range of motion  He exhibits no edema, tenderness or deformity  Neurological: He is alert and oriented to person, place, and time  No cranial nerve deficit  Skin: Skin is warm and dry  No rash noted  No erythema  Psychiatric: He has a normal mood and affect   His behavior is normal  Judgment and thought content normal        Lab Results:   CBC:   Lab Results   Component Value Date    WBC 14 60 (H) 02/08/2018    HGB 17 5 (H) 02/08/2018    HCT 51 8 (H) 02/08/2018    MCV 86 02/08/2018     02/08/2018    MCH 28 9 02/08/2018    MCHC 33 8 02/08/2018    RDW 12 1 02/08/2018    MPV 9 7 02/08/2018    NRBC 0 02/08/2018   , CMP:   Lab Results   Component Value Date     02/08/2018    K 4 9 02/08/2018     02/08/2018    CO2 28 02/08/2018    ANIONGAP 10 02/08/2018    BUN 14 02/08/2018    CREATININE 1 28 02/08/2018    GLUCOSE 112 02/08/2018    CALCIUM 10 1 02/08/2018    AST 24 02/08/2018    ALT 53 02/08/2018    ALKPHOS 69 02/08/2018    PROT 8 7 (H) 02/08/2018    BILITOT 0 50 02/08/2018    EGFR 73 02/08/2018     Imaging: I have personally reviewed pertinent reports  and I have personally reviewed pertinent films in PACS  EKG, Pathology, and Other Studies: I have personally reviewed pertinent reports  Code Status: No Order  Advance Directive and Living Will:      Power of :    POLST:      Counseling / Coordination of Care  Total floor / unit time spent today 40 minutes  Greater than 50% of total time was spent with the patient and / or family counseling and / or coordination of care  A description of the counseling / coordination of care: explanation of pathology and treatment plan, patient understands and agrees

## 2018-02-08 NOTE — ED PROVIDER NOTES
History  Chief Complaint   Patient presents with    Abdominal Pain     Pt stated that he started to have severe abdominal pain since yesterday  C/o of chills, nausea, and constipation     Patient is a 27-year-old male with past medical history of hypertension, hyperlipidemia and depression, presents to the emergency department by ambulance for periumbilical abdominal pain  Patient states yesterday he was feeling mild central lower abdominal pain that waxed and waned however this morning, the pain migrated up towards his umbilicus and has been more severe and constant  He states it waxes and wanes in severity but is always there  It is sharp and he feels like something is inside his abdomen trying to push things out  He states when he ate yesterday, he initially had some relief of the pain but then the pain worsened again  He denies any other alleviating or aggravating factors  Denies feeling this type of pain in the past   He has had nausea but no episodes of vomiting  He reports not having a bowel movement this morning but did have a normal bowel movement yesterday  He reports chills but no documented fever  Denies any other associated symptoms  No fever, headache, dizziness or near syncope, URI symptoms, cough, neck or back pain, chest pain, palpitations, shortness of breath, vomiting, diarrhea, constipation, bright red blood per rectum, melena, dysuria, frequency, hematuria, flank pain, testicular pain or swelling, penile discharge, skin rash or color change, extremity weakness or paresthesia or other focal neurologic deficits  He does report history of peptic ulcer disease but states when he would have pain associated with that it was in a different location and was not this severe  He admits to taking Aleve on occasion but does not use NSAIDs daily  At most he states he will take it once a week  He denies any recent sick contacts or recent travel outside the country    Denies any recent alcohol use or any history of pancreatitis in the past   Denies prior abdominal surgical history  History provided by:  Patient   used: No    Abdominal Pain   Pain location:  Periumbilical  Pain quality: pressure and sharp    Pain radiates to:  Does not radiate  Pain severity:  Severe  Onset quality:  Gradual  Duration:  2 days  Timing:  Constant  Progression:  Worsening  Chronicity:  New  Context: not alcohol use, not diet changes, not laxative use, not previous surgeries, not recent illness, not recent travel, not sick contacts, not suspicious food intake and not trauma    Relieved by:  Nothing  Worsened by:  Nothing  Ineffective treatments:  None tried  Associated symptoms: chills and nausea    Associated symptoms: no chest pain, no constipation, no cough, no diarrhea, no dysuria, no fatigue, no fever, no hematuria, no shortness of breath, no sore throat and no vomiting    Risk factors: NSAID use    Risk factors: no alcohol abuse and has not had multiple surgeries        Prior to Admission Medications   Prescriptions Last Dose Informant Patient Reported? Taking?    Cetirizine HCl (ZYRTEC ALLERGY) 10 MG CAPS   Yes Yes   Sig: Take 1 tablet by mouth daily   MULTIPLE VITAMINS PO   Yes Yes   Sig: Take by mouth   amLODIPine-valsartan (EXFORGE) 5-320 MG per tablet   Yes Yes   Sig: Take 1 tablet by mouth daily   escitalopram (LEXAPRO) 10 mg tablet   No No   Sig: Take 1 tablet (10 mg total) by mouth daily 2 tablets daily   fluticasone (FLONASE) 50 mcg/act nasal spray   Yes Yes   Si spray into each nostril daily   pseudoephedrine (SUDAFED) 120 MG 12 hr tablet   Yes Yes   Sig: Take 1 tablet by mouth every 12 (twelve) hours as needed   rosuvastatin (CRESTOR) 10 MG tablet   Yes Yes   Sig: Take 1 tablet by mouth      Facility-Administered Medications: None       Past Medical History:   Diagnosis Date    Depression     Hyperlipidemia     Hypertension        Past Surgical History:   Procedure Laterality Date  LEG SURGERY         History reviewed  No pertinent family history  I have reviewed and agree with the history as documented  Social History   Substance Use Topics    Smoking status: Never Smoker    Smokeless tobacco: Former User    Alcohol use Yes      Comment: Social        Review of Systems   Constitutional: Positive for chills  Negative for appetite change, diaphoresis, fatigue and fever  HENT: Negative for congestion, ear pain, rhinorrhea and sore throat  Eyes: Negative for photophobia, pain and visual disturbance  Respiratory: Negative for cough, chest tightness, shortness of breath and wheezing  Cardiovascular: Negative for chest pain and palpitations  Gastrointestinal: Positive for abdominal pain and nausea  Negative for constipation, diarrhea and vomiting  Genitourinary: Negative for discharge, dysuria, flank pain, frequency, hematuria, scrotal swelling and testicular pain  Musculoskeletal: Negative for back pain, neck pain and neck stiffness  Skin: Negative for color change, pallor, rash and wound  Allergic/Immunologic: Negative for immunocompromised state  Neurological: Negative for dizziness, syncope, weakness, light-headedness, numbness and headaches  Hematological: Negative for adenopathy  Psychiatric/Behavioral: Negative for confusion, decreased concentration and sleep disturbance  All other systems reviewed and are negative        Physical Exam  ED Triage Vitals [02/08/18 0940]   Temperature Pulse Respirations Blood Pressure SpO2   99 °F (37 2 °C) 95 16 140/95 98 %      Temp Source Heart Rate Source Patient Position - Orthostatic VS BP Location FiO2 (%)   Oral Monitor Lying Right arm --      Pain Score       8         Vitals:    02/08/18 1606 02/08/18 1615 02/08/18 1645 02/08/18 1715   BP:  132/71 136/82 157/80   BP Location:   Right arm Right arm   Pulse: 76 83 78 74   Resp: 14 13 15 16   Temp:   98 3 °F (36 8 °C) 98 5 °F (36 9 °C)   TempSrc:   Oral Oral   SpO2: 96% 95% 95% 95%   Weight:       Height:         Physical Exam   Constitutional: He is oriented to person, place, and time  He appears well-developed and well-nourished  No distress  Patient is in no distress but appears uncomfortable secondary to pain  HENT:   Head: Normocephalic and atraumatic  Mouth/Throat: No oropharyngeal exudate  Dry mucous membranes  Eyes: Conjunctivae and EOM are normal  Pupils are equal, round, and reactive to light  Neck: Normal range of motion  Neck supple  No JVD present  Cardiovascular: Normal rate, regular rhythm, normal heart sounds and intact distal pulses  Exam reveals no gallop and no friction rub  No murmur heard  Pulmonary/Chest: [place] normal and breath sounds normal  No respiratory distress  He has no wheezes  He has no rales  He exhibits no tenderness  Abdominal: Soft  Bowel sounds are normal  He exhibits no distension  There is tenderness  There is no rebound and no guarding  Diffuse abdominal tenderness  Most significant tenderness in the right upper and left upper quadrants  No tenderness at McBurney's point  Musculoskeletal: Normal range of motion  He exhibits no edema or tenderness  Neurological: He is alert and oriented to person, place, and time  No gross motor or sensory deficits  Skin: Skin is warm and dry  No rash noted  He is not diaphoretic  No pallor  Psychiatric: He has a normal mood and affect  His behavior is normal    Nursing note and vitals reviewed        ED Medications  Medications   sodium chloride 0 9 % infusion (not administered)   HYDROmorphone (DILAUDID) injection 1 mg ( Intravenous MAR Unhold 2/8/18 1509)   ondansetron (ZOFRAN) injection 4 mg ( Intravenous MAR Unhold 2/8/18 1509)   enoxaparin (LOVENOX) subcutaneous injection 40 mg ( Subcutaneous MAR Unhold 2/8/18 1509)   HYDROcodone-acetaminophen (NORCO) 5-325 mg per tablet 1 tablet (not administered)   dextrose 5 % and sodium chloride 0 45 % infusion (100 mL/hr Intravenous New Bag 2/8/18 1614)   sodium chloride 0 9 % bolus 1,000 mL (0 mL Intravenous Stopped 2/8/18 1050)   ondansetron (ZOFRAN) injection 4 mg (4 mg Intravenous Given 2/8/18 0954)   aluminum-magnesium hydroxide-simethicone (MYLANTA) 200-200-20 mg/5 mL oral suspension 30 mL (30 mL Oral Given 2/8/18 0954)   pantoprazole (PROTONIX) injection 40 mg (40 mg Intravenous Given 2/8/18 0952)   lidocaine viscous (XYLOCAINE) 2 % mucosal solution 10 mL (10 mL Swish & Swallow Given 2/8/18 0954)   iohexol (OMNIPAQUE) 350 MG/ML injection (MULTI-DOSE) 100 mL (100 mL Intravenous Given 2/8/18 1026)   HYDROmorphone (DILAUDID) injection 1 mg (1 mg Intravenous Given 2/8/18 1048)   fentaNYL (SUBLIMAZE) injection 25 mcg (25 mcg Intravenous Given 2/8/18 1549)       Diagnostic Studies  Results Reviewed     Procedure Component Value Units Date/Time    UA w Reflex to Microscopic [[id number]]  (Normal) Collected:  02/08/18 1252    Lab Status:  Final result Specimen:  Urine from Urine, Clean Catch Updated:  02/08/18 1334     Color, UA Yellow     Clarity, UA Clear     Specific Gravity, UA <=1 005     pH, UA 6 0     Leukocytes, UA Negative     Nitrite, UA Negative     Protein, UA Negative mg/dl      Glucose, UA Negative mg/dl      Ketones, UA Negative mg/dl      Urobilinogen, UA 0 2 E U /dl      Bilirubin, UA Negative     Blood, UA Negative    Comprehensive metabolic panel [[id number]]  (Abnormal) Collected:  02/08/18 0949    Lab Status:  Final result Specimen:  Blood from Arm, Left Updated:  02/08/18 1014     Sodium 138 mmol/L      Potassium 4 9 mmol/L      Chloride 100 mmol/L      CO2 28 mmol/L      Anion Gap 10 mmol/L      BUN 14 mg/dL      Creatinine 1 28 mg/dL      Glucose 112 mg/dL      Calcium 10 1 mg/dL      AST 24 U/L      ALT 53 U/L      Alkaline Phosphatase 69 U/L      Total Protein 8 7 (H) g/dL      Albumin 4 3 g/dL      Total Bilirubin 0 50 mg/dL      eGFR 73 ml/min/1 73sq m     Narrative:         National Kidney Disease Education Program recommendations are as follows:  GFR calculation is accurate only with a steady state creatinine  Chronic Kidney disease less than 60 ml/min/1 73 sq  meters  Kidney failure less than 15 ml/min/1 73 sq  meters  Lipase [[id number]]  (Normal) Collected:  02/08/18 0949    Lab Status:  Final result Specimen:  Blood from Arm, Left Updated:  02/08/18 1014     Lipase 76 u/L     CBC and differential [[id number]]  (Abnormal) Collected:  02/08/18 0949    Lab Status:  Final result Specimen:  Blood from Arm, Left Updated:  02/08/18 0955     WBC 14 60 (H) Thousand/uL      RBC 6 06 (H) Million/uL      Hemoglobin 17 5 (H) g/dL      Hematocrit 51 8 (H) %      MCV 86 fL      MCH 28 9 pg      MCHC 33 8 g/dL      RDW 12 1 %      MPV 9 7 fL      Platelets 324 Thousands/uL      nRBC 0 /100 WBCs      Neutrophils Relative 84 (H) %      Lymphocytes Relative 10 (L) %      Monocytes Relative 5 %      Eosinophils Relative 1 %      Basophils Relative 1 %      Neutrophils Absolute 12 19 (H) Thousands/µL      Lymphocytes Absolute 1 44 Thousands/µL      Monocytes Absolute 0 69 Thousand/µL      Eosinophils Absolute 0 16 Thousand/µL      Basophils Absolute 0 07 Thousands/µL                  CT abdomen pelvis with contrast   Final Result by Linda Prince MD (02/08 1040)      Findings most consistent with early or partial small bowel obstruction with sharp transition from moderately distended to collapsed loops to the left of midline in the mid to lower abdomen  The obstruction appears to be on the basis of internal hernia  Small volume of reactive interloop ascites and free pelvic ascites with no evidence of abscess or clement peritoneal air  Workstation performed: [id number]                    Procedures  Procedures       Phone Contacts  ED Phone Contact    ED Course  ED Course as of Feb 08 1816   Thu Feb 08, 2018   1042 Patient reassessed and states he has had no improvement in pain since the GI cocktail    Will avoid NSAIDs due to history of peptic ulcer disease and give 1 mg of IV Dilaudid  Still awaiting CT scan read  1115 General surgery at bedside  MDM  Number of Diagnoses or Management Options  Diagnosis management comments: 59-year-old male presenting with 2 days of progressively worsening central abdominal pain and nausea  Differential includes exacerbation of peptic ulcer disease, gastritis, enteritis, colitis, biliary colic or cholecystitis, appendicitis, diverticulitis, pancreatitis, hepatitis, renal colic however less likely given the location of pain and absence of urinary symptoms  Will check abdominal labs, urinalysis and obtain a CT scan of the abdomen and pelvis  Will treat with Zofran, GI cocktail, Protonix and IV fluid bolus         Amount and/or Complexity of Data Reviewed  Clinical lab tests: ordered and reviewed  Tests in the radiology section of CPT®: reviewed and ordered  Independent visualization of images, tracings, or specimens: yes      CritCare Time    Disposition  Final diagnoses:   Periumbilical abdominal pain   Partial small bowel obstruction   Internal hernia   Nausea   Leukocytosis     Time reflects when diagnosis was documented in both MDM as applicable and the Disposition within this note     Time User Action Codes Description Comment    2/8/2018 10:53 AM Onnie Colace E Add [Q58 06] Periumbilical abdominal pain     2/8/2018 10:53 AM Onnie Colace E Add [K56 600] Partial small bowel obstruction     2/8/2018 10:53 AM Onnie Colace E Add [K45 8] Internal hernia     2/8/2018 10:53 AM Onnie Colace E Add [R11 0] Nausea     2/8/2018 10:53 AM Onnie Colace E Add [D72 829] Leukocytosis     2/8/2018  2:09 PM Manan Cruz Modify [K56 600] Partial small bowel obstruction     2/8/2018  2:09 PM Yani Hinds Modify [K45 8] Internal hernia     2/8/2018  2:09 PM Manan Cruz Modify [D72 829] Leukocytosis     2/8/2018  3:11 PM Chavo Cuellar Modify [W67 385] Partial small bowel obstruction     2/8/2018  3:11 PM Cynthea Filler Modify [R29 3] Internal hernia     2/8/2018  3:11 PM Cynthea Filler Modify [V96 733] Leukocytosis       ED Disposition     ED Disposition Condition Comment    Admit  Case was discussed with Dr Rosa Singleton and the patient's admission status was agreed to be Admission Status: inpatient status to the service of Dr Rosa Singleton   Follow-up Information    None       Current Discharge Medication List      CONTINUE these medications which have NOT CHANGED    Details   amLODIPine-valsartan (EXFORGE) 5-320 MG per tablet Take 1 tablet by mouth daily      Cetirizine HCl (ZYRTEC ALLERGY) 10 MG CAPS Take 1 tablet by mouth daily      fluticasone (FLONASE) 50 mcg/act nasal spray 1 spray into each nostril daily      MULTIPLE VITAMINS PO Take by mouth      pseudoephedrine (SUDAFED) 120 MG 12 hr tablet Take 1 tablet by mouth every 12 (twelve) hours as needed      rosuvastatin (CRESTOR) 10 MG tablet Take 1 tablet by mouth      escitalopram (LEXAPRO) 10 mg tablet Take 1 tablet (10 mg total) by mouth daily 2 tablets daily  Qty: 90 tablet, Refills: 1    Associated Diagnoses: Depression, unspecified depression type           No discharge procedures on file      ED Provider  Electronically Signed by           Caryn Bautista DO  02/08/18 5717

## 2018-02-08 NOTE — ANESTHESIA POSTPROCEDURE EVALUATION
Post-Op Assessment Note      CV Status:  Stable    Mental Status:  Alert and awake    Hydration Status:  Stable    PONV Controlled:  None    Airway Patency:  Patent and adequate    Post Op Vitals Reviewed: Yes          Staff: Anesthesiologist, CRNA           BP   158/94   Temp   99 1   Pulse  85   Resp  18   SpO2   99%

## 2018-02-08 NOTE — ED NOTES
Attempted to call report to [facility]  RN unable to take at this time  Will try again at a later time        Bethany Villalba RN  02/08/18 3055

## 2018-02-08 NOTE — OP NOTE
OPERATIVE REPORT  PATIENT NAME: Corin Negron    :  1985  MRN: [id number]  Pt Location: [facility] OR ROOM 03    SURGERY DATE: 2018    Surgeon(s) and Role:     Desirae Gurrola MD - Primary     * Shantell Snyder PA-C - Assisting    Preop Diagnosis:  Leukocytosis [D72 829]  Internal hernia [K45 8]  Partial small bowel obstruction [K56 600]    Post-Op Diagnosis Codes:     * Leukocytosis [D72 829]     * Internal hernia [K45 8]     * Partial small bowel obstruction [K56 600]    Procedure(s) (LRB):  EXPLORATORY LAPAROTOMY (N/A)    Specimen(s):  ID Type Source Tests Collected by Time Destination   A : peritoneal fluid Body Fluid Peritoneal Fluid ANAEROBIC CULTURE AND GRAM STAIN, BODY FLUID CULTURE AND GRAM STAIN Latia De Leon MD 2018 1408        Estimated Blood Loss:   Minimal    Drains:       Anesthesia Type:   General    Operative Indications:  Leukocytosis [D72 829]  Internal hernia [K45 8]  Partial small bowel obstruction [V06656]  19-year-old male presented to the emergency room with 2 day history of increasing mid abdominal pain associated with nausea without vomiting  The pain was 8/10 in intensity  The patient came into the emergency room in the white cell count was elevated to 15,000, CT scan of the abdomen and pelvis showed findings most consistent with early or partial small bowel obstruction which sharp transition with moderately distended to collapsed loops to the left of midline in the mid to lower abdomen  The obstruction appears to be on the basis of internal hernia  Small volume of reacted interloop abscess site ease and free pelvic ascites with no evidence of abscess or clement peritoneal air  The patient was advised to undergo exploratory laparotomy    Operative Findings: There was moderate amount of ascitic fluid, clear in color    The small bowel was run from the ligament of Treitz down to the terminal ileum in the only positive finding was a jejunal diverticulum with white mild without any contents  The rest of the abdominal cavity showed no evidence of inflammatory neoplastic process  There was no evidence of internal hernia or strangulated bowel  Bowel was completely viable  Liver surface appeared normal     Complications:   None    Procedure and Technique:  The patient was identified in the patient was placed in the operating table in a supine position  After adequate anesthesia induction and satisfactory endotracheal intubation the abdomen was prepped and draped in a sterile usual fashion with ChloraPrep  Time-out was called the patient was identified as was surgical site  Midline incision was made with a scalpel, taken down through the subcutaneous tissue with cautery  The fascia peritoneum were opened with cautery  There was moderate amount of ascitic fluid, cultures were taken  Once we entered the abdominal cavity and exploratory laparotomy was performed with above findings  The large bowel was palpated with no evidence of inflammatory or neoplastic process  Liver surface and gallbladder appear normal  The small bowel was run twice with no evidence of internal hernia, strangulation or ischemia  Interceed was placed to prevent adhesions  At this point proceeded to close the fascia with 1 looped PDS in a continuous fashion  The subcutaneous tissue was copiously irrigated with sterile solution  The subcutaneous tissue was approximated with a 3 0 Vicryl in an interrupted fashion and the skin was closed with 4 O Vicryl in a continuous subcuticular fashion  Sterile dressing were applied  At the end of the case instrument, needles, and sponges counts were correct  The patient tolerated the procedure well     I was present for the entire procedure, A qualified resident physician was not available and A physician assistant was required during the procedure for retraction tissue handling,dissection and suturing    Patient Disposition:  PACU     SIGNATURE: Danny Blakc MD  DATE: February 8, 2018  TIME: 2:29 PM

## 2018-02-09 LAB
ANION GAP SERPL CALCULATED.3IONS-SCNC: 8 MMOL/L (ref 4–13)
BUN SERPL-MCNC: 13 MG/DL (ref 5–25)
CALCIUM SERPL-MCNC: 8.4 MG/DL (ref 8.3–10.1)
CHLORIDE SERPL-SCNC: 100 MMOL/L (ref 100–108)
CO2 SERPL-SCNC: 29 MMOL/L (ref 21–32)
CREAT SERPL-MCNC: 1.04 MG/DL (ref 0.6–1.3)
ERYTHROCYTE [DISTWIDTH] IN BLOOD BY AUTOMATED COUNT: 12.2 % (ref 11.6–15.1)
GFR SERPL CREATININE-BSD FRML MDRD: 94 ML/MIN/1.73SQ M
GLUCOSE SERPL-MCNC: 146 MG/DL (ref 65–140)
HCT VFR BLD AUTO: 44.4 % (ref 36.5–49.3)
HGB BLD-MCNC: 15.1 G/DL (ref 12–17)
MCH RBC QN AUTO: 29 PG (ref 26.8–34.3)
MCHC RBC AUTO-ENTMCNC: 34 G/DL (ref 31.4–37.4)
MCV RBC AUTO: 85 FL (ref 82–98)
PLATELET # BLD AUTO: 238 THOUSANDS/UL (ref 149–390)
PMV BLD AUTO: 9.7 FL (ref 8.9–12.7)
POTASSIUM SERPL-SCNC: 4.1 MMOL/L (ref 3.5–5.3)
RBC # BLD AUTO: 5.2 MILLION/UL (ref 3.88–5.62)
SODIUM SERPL-SCNC: 137 MMOL/L (ref 136–145)
WBC # BLD AUTO: 13.84 THOUSAND/UL (ref 4.31–10.16)

## 2018-02-09 PROCEDURE — 94760 N-INVAS EAR/PLS OXIMETRY 1: CPT

## 2018-02-09 PROCEDURE — 80048 BASIC METABOLIC PNL TOTAL CA: CPT | Performed by: PHYSICIAN ASSISTANT

## 2018-02-09 PROCEDURE — 99024 POSTOP FOLLOW-UP VISIT: CPT | Performed by: PHYSICIAN ASSISTANT

## 2018-02-09 PROCEDURE — 85027 COMPLETE CBC AUTOMATED: CPT | Performed by: PHYSICIAN ASSISTANT

## 2018-02-09 RX ORDER — KETOROLAC TROMETHAMINE 30 MG/ML
30 INJECTION, SOLUTION INTRAMUSCULAR; INTRAVENOUS EVERY 6 HOURS PRN
Status: DISPENSED | OUTPATIENT
Start: 2018-02-09 | End: 2018-02-11

## 2018-02-09 RX ADMIN — HYDROMORPHONE HYDROCHLORIDE 1 MG: 1 INJECTION, SOLUTION INTRAMUSCULAR; INTRAVENOUS; SUBCUTANEOUS at 05:58

## 2018-02-09 RX ADMIN — HYDROMORPHONE HYDROCHLORIDE 1 MG: 1 INJECTION, SOLUTION INTRAMUSCULAR; INTRAVENOUS; SUBCUTANEOUS at 10:17

## 2018-02-09 RX ADMIN — HYDROCODONE BITARTRATE AND ACETAMINOPHEN 1 TABLET: 5; 325 TABLET ORAL at 04:53

## 2018-02-09 RX ADMIN — HYDROCODONE BITARTRATE AND ACETAMINOPHEN 1 TABLET: 5; 325 TABLET ORAL at 08:41

## 2018-02-09 RX ADMIN — KETOROLAC TROMETHAMINE 30 MG: 30 INJECTION, SOLUTION INTRAMUSCULAR at 21:45

## 2018-02-09 RX ADMIN — HYDROMORPHONE HYDROCHLORIDE 1 MG: 1 INJECTION, SOLUTION INTRAMUSCULAR; INTRAVENOUS; SUBCUTANEOUS at 18:50

## 2018-02-09 RX ADMIN — HYDROMORPHONE HYDROCHLORIDE 1 MG: 1 INJECTION, SOLUTION INTRAMUSCULAR; INTRAVENOUS; SUBCUTANEOUS at 01:28

## 2018-02-09 RX ADMIN — DEXTROSE AND SODIUM CHLORIDE 100 ML/HR: 5; 450 INJECTION, SOLUTION INTRAVENOUS at 15:28

## 2018-02-09 RX ADMIN — ENOXAPARIN SODIUM 40 MG: 40 INJECTION SUBCUTANEOUS at 08:41

## 2018-02-09 RX ADMIN — HYDROMORPHONE HYDROCHLORIDE 1 MG: 1 INJECTION, SOLUTION INTRAMUSCULAR; INTRAVENOUS; SUBCUTANEOUS at 15:27

## 2018-02-09 RX ADMIN — HYDROCODONE BITARTRATE AND ACETAMINOPHEN 1 TABLET: 5; 325 TABLET ORAL at 13:57

## 2018-02-09 RX ADMIN — DEXTROSE AND SODIUM CHLORIDE 100 ML/HR: 5; 450 INJECTION, SOLUTION INTRAVENOUS at 05:15

## 2018-02-09 RX ADMIN — SODIUM CHLORIDE 125 ML/HR: 0.9 INJECTION, SOLUTION INTRAVENOUS at 05:10

## 2018-02-09 NOTE — CASE MANAGEMENT
Initial Clinical Review    Admission: Date/Time/Statement: 2/8/18 @ 1055     Orders Placed This Encounter   Procedures    Inpatient Admission (expected length of stay for this patient is greater than two midnights)     Standing Status:   Standing     Number of Occurrences:   1     Order Specific Question:   Admitting Physician     Answer:   Trae Cary     Order Specific Question:   Level of Care     Answer:   Med Surg [16]     Order Specific Question:   Estimated length of stay     Answer:   More than 2 Midnights     Order Specific Question:   Certification     Answer:   I certify that inpatient services are medically necessary for this patient for a duration of greater than two midnights  See H&P and MD Progress Notes for additional information about the patient's course of treatment  ED: Date/Time/Mode of Arrival:   ED Arrival Information     Expected Arrival Acuity Means of Arrival Escorted By Service Admission Type    - 2/8/2018 09:39 Urgent Ambulance [address]  [facility] General Medicine Urgent    Arrival Complaint    Abdominal pain          Chief Complaint:   Chief Complaint   Patient presents with    Abdominal Pain     Pt stated that he started to have severe abdominal pain since yesterday  C/o of chills, nausea, and constipation       History of Illness: Sanjeev Sanchez is a 34 y o  male who presents with 24 hr HX of abdominal pain and some chills, no emesis, no previous SX as such, and no previous abdominal surgery  Last BM last night, no diarrhea, NPO since last night  Pain is diffusely periumbilical and deep with feelings of pressure and sharp pain  PMH remarkable for no abdominal surgery, HTN hyperlipidemia, and depression  CT reveals upper/mid SBO suspect for internal hernia  WBC 14 6K      ED Vital Signs:   ED Triage Vitals [02/08/18 0940]   Temperature Pulse Respirations Blood Pressure SpO2   99 °F (37 2 °C) 95 16 140/95 98 %      Temp Source Heart Rate Source Patient Position - Orthostatic VS BP Location FiO2 (%)   Oral Monitor Lying Right arm --      Pain Score       8        Wt Readings from Last 1 Encounters:   02/08/18 112 kg (246 lb 14 6 oz)       Vital Signs (abnormal): wnl    Abnormal Labs/Diagnostic Test Results: total prot  8 7, wbc  14 60, H&H   17 5  51 8  CT abd -      Findings most consistent with early or partial small bowel obstruction with sharp transition from moderately distended to collapsed loops to the left of midline in the mid to lower abdomen   The obstruction appears to be on the basis of internal hernia     Small volume of reactive interloop ascites and free pelvic ascites with no evidence of abscess or clement peritoneal air  ED Treatment:   Medication Administration from 02/08/2018 0939 to 02/08/2018 1158       Date/Time Order Dose Route Action Action by Comments     02/08/2018 1050 sodium chloride 0 9 % bolus 1,000 mL 0 mL Intravenous Stopped Gena Christie RN      02/08/2018 0950 sodium chloride 0 9 % bolus 1,000 mL 1,000 mL Intravenous Kim 37 Gena Christie RN      02/08/2018 0954 ondansetron (ZOFRAN) injection 4 mg 4 mg Intravenous Given Sienna Him, RN      02/08/2018 0954 aluminum-magnesium hydroxide-simethicone (MYLANTA) 200-200-20 mg/5 mL oral suspension 30 mL 30 mL Oral Given Sienna Sylvester, RN      02/08/2018 4572 pantoprazole (PROTONIX) injection 40 mg 40 mg Intravenous Given Sienna Him, RN      02/08/2018 0954 lidocaine viscous (XYLOCAINE) 2 % mucosal solution 10 mL 10 mL Swish & Swallow Given Sienna Sylvester, RN      02/08/2018 1026 iohexol (OMNIPAQUE) 350 MG/ML injection (MULTI-DOSE) 100 mL 100 mL Intravenous Given Irina Mannheim      02/08/2018 1048 HYDROmorphone (DILAUDID) injection 1 mg 1 mg Intravenous Given Gena Christie RN           Past Medical/Surgical History:    Active Ambulatory Problems     Diagnosis Date Noted    No Active Ambulatory Problems     Resolved Ambulatory Problems     Diagnosis Date Noted    No Resolved Ambulatory Problems     Past Medical History:   Diagnosis Date    Depression     Hyperlipidemia     Hypertension        Admitting Diagnosis: Leukocytosis [D72 829]  Nausea [R11 0]  Abdominal pain [W03 4]  Periumbilical abdominal pain [R10 33]  Internal hernia [K45 8]  Partial small bowel obstruction [K56 600]    Age/Sex: 34 y o  male    Assessment/Plan: Assessment:  Abdominal pain with SBO and leucocytosis               Plan:  IVF with antibiotic coverage  Pain relief  Exploratory laparoscopy     Admission Orders:  Scheduled Meds:   Current Facility-Administered Medications:  dextrose 5 % and sodium chloride 0 45 % 100 mL/hr Intravenous Continuous Rudy Primmer, PA-C Last Rate: 100 mL/hr (02/09/18 0515)   enoxaparin 40 mg Subcutaneous Daily Jaimee Montoya MD    HYDROcodone-acetaminophen 1 tablet Oral Q4H PRN Rudy Primmer, PA-C    HYDROmorphone 1 mg Intravenous Q3H PRN Rudy Primmer, PA-C    ondansetron 4 mg Intravenous Q6H PRN Jaimee Montoya MD    sodium chloride 125 mL/hr Intravenous Continuous Rudy Primmer, PA-C Last Rate: Stopped (02/09/18 0515)     Continuous Infusions:   dextrose 5 % and sodium chloride 0 45 % 100 mL/hr Last Rate: 100 mL/hr (02/09/18 0515)   sodium chloride 125 mL/hr Last Rate: Stopped (02/09/18 0515)     PRN Meds: HYDROcodone-acetaminophen    HYDROmorphone  x4    ondansetron     Clear liq diet - NPO   Up as jorgito   cpap   SCD  2/8 exp lap     OP note 2/8  EXPLORATORY LAPAROTOMY (N/A)  Operative Findings: There was moderate amount of ascitic fluid, clear in color  The small bowel was run from the ligament of Treitz down to the terminal ileum in the only positive finding was a jejunal diverticulum with white mild without any contents  The rest of the abdominal cavity showed no evidence of inflammatory neoplastic process  There was no evidence of internal hernia or strangulated bowel  Bowel was completely viable    Liver surface appeared normal     Thank you,  [address] [facility] in the [facility] by Landon Chu for 2017  Network Utilization Review Department  Phone: [phone]; Fax [phone]  ATTENTION: The Network Utilization Review Department is now centralized for our 7 Facilities  Make a note that we have a new phone and fax numbers for our Department  Please call with any questions or concerns to [phone] and carefully follow the prompts so that you are directed to the right person  All voicemails are confidential  Fax any determinations, approvals, denials, and requests for initial or continue stay review clinical to [phone]  Due to HIGH CALL volume, it would be easier if you could please send faxed requests to expedite your requests and in part, help us provide discharge notifications faster

## 2018-02-09 NOTE — POST OP PROGRESS NOTES
Progress Note -Surgery PA  Sanjeev Sanchez 34 y o  male MRN: [id number]  Unit/Bed#: -01 Encounter: [id number]      Assessment/Plan   POD #1 s/p exploratory laparotomy - findings of moderate ascitic fluid, clear in color, jejunal diverticulum, and no evidence of internal hernia or strangulated bowel or evidence of inflammatory neoplastic process  - afebrile, vitals stable, no nausea  - Patient on CLD and tolerating with no nausea  Occasional cramping abdominal pain  - Generalized abdominal pain on palpation  - decreased bowel sounds, no flatus  Leukocytosis - WBC 13    Plan:  - Continue CLD, as tolerated  - Continue with pain medication, prn  - Patient shouold be OOB to chair and ambulate  - Serial abdominal exams  - encourage IS  - DVT prophylaxis  - Dressing removed tomorrow  ______________________________________________________________________  CC: I feel sore  Subjective:   Having abdominal pain and soreness  He says it's similar to the pain that brought him in but also incisional pain  Generalized TTP  Tolerating CLD with no nausea  Denies flatus  No BM  Has been OOB and ambulating to bathroom without difficulty  Denies SOB, CP, palpitations, fever, or chills  Objective:       Vitals:  /72 (BP Location: Right arm)   Pulse 78   Temp 97 7 °F (36 5 °C) (Oral)   Resp 18   Ht 5' 9" (1 753 m)   Wt 112 kg (246 lb 14 6 oz)   SpO2 99%   BMI 36 46 kg/m²     I/Os:  I/O last 3 completed shifts: In: 7902 [P O :80; I V :2275; IV Piggyback:2000]  Out: 3796 [Urine:1650]    No intake/output data recorded      Invasive Devices     Peripheral Intravenous Line            Peripheral IV 02/08/18 Left Antecubital 1 day    Peripheral IV 02/08/18 Left Wrist less than 1 day    Peripheral IV 02/08/18 Right Hand less than 1 day                Medications:  Current Facility-Administered Medications   Medication Dose Route Frequency    dextrose 5 % and sodium chloride 0 45 % infusion  100 mL/hr Intravenous Continuous    enoxaparin (LOVENOX) subcutaneous injection 40 mg  40 mg Subcutaneous Daily    HYDROcodone-acetaminophen (NORCO) 5-325 mg per tablet 1 tablet  1 tablet Oral Q4H PRN    HYDROmorphone (DILAUDID) injection 1 mg  1 mg Intravenous Q3H PRN    ondansetron (ZOFRAN) injection 4 mg  4 mg Intravenous Q6H PRN                 Lab Results and Cultures:   CBC with diff:   Lab Results   Component Value Date    WBC 13 84 (H) 02/09/2018    HGB 15 1 02/09/2018    HCT 44 4 02/09/2018    MCV 85 02/09/2018     02/09/2018    MCH 29 0 02/09/2018    MCHC 34 0 02/09/2018    RDW 12 2 02/09/2018    MPV 9 7 02/09/2018    NRBC 0 02/08/2018       BMP/CMP:  Lab Results   Component Value Date     02/09/2018     06/07/2017    K 4 1 02/09/2018    K 4 9 06/07/2017     02/09/2018    CL 96 06/07/2017    CO2 29 02/09/2018    CO2 24 06/07/2017    ANIONGAP 8 02/09/2018    BUN 13 02/09/2018    BUN 13 06/07/2017    CREATININE 1 04 02/09/2018    CREATININE 1 07 06/07/2017    GLUCOSE 146 (H) 02/09/2018    GLUCOSE 95 06/07/2017    CALCIUM 8 4 02/09/2018    CALCIUM 9 8 06/07/2017    AST 24 02/08/2018    ALT 53 02/08/2018    ALKPHOS 69 02/08/2018    PROT 8 7 (H) 02/08/2018    BILITOT 0 50 02/08/2018    EGFR 94 02/09/2018           Physical Exam:  General Appearance:    Alert and orientated x 3, cooperative, NAD, appears stated age   Lungs:     Clear to auscultation bilaterally, respirations unlabored, no wheezes    Heart:    Regular rate and rhythm, S1 and S2 normal, no murmur   Abdomen:    hypoactive BS, soft, generalized tenderness to palpation, non rigid, no masses, no palpated organomegaly  Wound/Dressing:  Dressing on wound with dry drainage  Intact   To be removed tomorrow   Extremities:  Extremities normal, no calf tenderness, no cyanosis or edema   Pulses:   2+ and symmetric all extremities   Skin:   Skin color, texture, turgor normal, no rashes   Neurologic:   CNII-XII intact, normal strength, affect appropriate Imaging:  Ct Abdomen Pelvis With Contrast    Result Date: 2/8/2018  Impression: Findings most consistent with early or partial small bowel obstruction with sharp transition from moderately distended to collapsed loops to the left of midline in the mid to lower abdomen  The obstruction appears to be on the basis of internal hernia  Small volume of reactive interloop ascites and free pelvic ascites with no evidence of abscess or clement peritoneal air   Workstation performed: [id number]       VTE Pharmacologic Prophylaxis: Enoxaparin (Lovenox)  VTE Mechanical Prophylaxis: sequential compression device    Nasreen Johnson PA-C   2/9/2018

## 2018-02-10 PROCEDURE — 99024 POSTOP FOLLOW-UP VISIT: CPT | Performed by: SURGERY

## 2018-02-10 RX ADMIN — KETOROLAC TROMETHAMINE 30 MG: 30 INJECTION, SOLUTION INTRAMUSCULAR at 16:21

## 2018-02-10 RX ADMIN — HYDROMORPHONE HYDROCHLORIDE 1 MG: 1 INJECTION, SOLUTION INTRAMUSCULAR; INTRAVENOUS; SUBCUTANEOUS at 01:37

## 2018-02-10 RX ADMIN — HYDROCODONE BITARTRATE AND ACETAMINOPHEN 1 TABLET: 5; 325 TABLET ORAL at 10:13

## 2018-02-10 RX ADMIN — ENOXAPARIN SODIUM 40 MG: 40 INJECTION SUBCUTANEOUS at 10:13

## 2018-02-10 RX ADMIN — DEXTROSE AND SODIUM CHLORIDE 100 ML/HR: 5; 450 INJECTION, SOLUTION INTRAVENOUS at 01:40

## 2018-02-10 NOTE — PROGRESS NOTES
Progress Note - General Surgery   Arsen Nolan 34 y o  male MRN: [id number]  Unit/Bed#: -01 Encounter: [id number]    Assessment:  Status post exploratory laparotomy for internal hernia, clinically stable, passing flatus but no bowel movement  Tolerating clear liquid diet  Plan:  Continue with present care  The patient was encouraged to increase activity, walking the hallway    Subjective/Objective   Chief Complaint:  Incisional pain, no nausea or vomiting    Subjective:  Denies chest pain or shortness of breath    Objective:     Blood pressure 136/83, pulse 79, temperature 97 7 °F (36 5 °C), temperature source Oral, resp  rate 18, height 5' 9" (1 753 m), weight 112 kg (246 lb 14 6 oz), SpO2 97 %  ,Body mass index is 36 46 kg/m²  Intake/Output Summary (Last 24 hours) at 02/10/18 1030  Last data filed at 02/10/18 1016   Gross per 24 hour   Intake          3428 33 ml   Output                0 ml   Net          3428 33 ml       Invasive Devices     Peripheral Intravenous Line            Peripheral IV 02/08/18 Left Antecubital 2 days    Peripheral IV 02/08/18 Left Wrist 1 day    Peripheral IV 02/08/18 Right Hand 1 day                Physical Exam:  Abdomen is soft, nondistended mildly tender over the incision which is dry and intact      Lab, Imaging and other studies:CBC: No results found for: WBC, HGB, HCT, MCV, PLT, ADJUSTEDWBC, MCH, MCHC, RDW, MPV, NRBC, CMP: No results found for: NA, K, CL, CO2, ANIONGAP, BUN, CREATININE, GLUCOSE, CALCIUM, AST, ALT, ALKPHOS, PROT, ALBUMIN, BILITOT, EGFR  VTE Pharmacologic Prophylaxis: Sequential compression device (Venodyne)   VTE Mechanical Prophylaxis: sequential compression device

## 2018-02-11 LAB
BACTERIA SPEC ANAEROBE CULT: NO GROWTH
BACTERIA SPEC BFLD CULT: NO GROWTH
GRAM STN SPEC: NORMAL

## 2018-02-11 PROCEDURE — 99024 POSTOP FOLLOW-UP VISIT: CPT | Performed by: SURGERY

## 2018-02-11 RX ADMIN — ENOXAPARIN SODIUM 40 MG: 40 INJECTION SUBCUTANEOUS at 09:12

## 2018-02-11 RX ADMIN — HYDROCODONE BITARTRATE AND ACETAMINOPHEN 1 TABLET: 5; 325 TABLET ORAL at 09:12

## 2018-02-11 RX ADMIN — HYDROCODONE BITARTRATE AND ACETAMINOPHEN 1 TABLET: 5; 325 TABLET ORAL at 17:39

## 2018-02-11 NOTE — PROGRESS NOTES
Progress Note - General Surgery   Bettye Paget 34 y o  male MRN: [id number]  Unit/Bed#: -01 Encounter: [id number]    Assessment:  Status post exploratory laparotomy for internal hernia, clinically stable, passing flatus but no bowel movement  Complaining of pain on the right side of the abdomen    Plan:  No obvious evidence for intra-abdominal complications, I will advance his diet  Tentatively discharge tomorrow or Tuesday    Subjective/Objective   Chief Complaint:  Pain on the right side of the abdomen    Subjective:  Denies any chills, fever, nausea, vomiting    Objective:     Blood pressure 137/75, pulse 72, temperature 98 7 °F (37 1 °C), temperature source Oral, resp  rate 16, height 5' 9" (1 753 m), weight 112 kg (246 lb 14 6 oz), SpO2 97 %  ,Body mass index is 36 46 kg/m²  Intake/Output Summary (Last 24 hours) at 02/11/18 1121  Last data filed at 02/11/18 8177   Gross per 24 hour   Intake              240 ml   Output              400 ml   Net             -160 ml       Invasive Devices     Peripheral Intravenous Line            Peripheral IV 02/08/18 Left Antecubital 3 days    Peripheral IV 02/08/18 Right Hand 2 days                Physical Exam:  The abdomen is soft, nondistended and tender on the right side, no evidence of hematoma or peritoneal signs      Lab, Imaging and other studies:CBC: No results found for: WBC, HGB, HCT, MCV, PLT, ADJUSTEDWBC, MCH, MCHC, RDW, MPV, NRBC, CMP: No results found for: NA, K, CL, CO2, ANIONGAP, BUN, CREATININE, GLUCOSE, CALCIUM, AST, ALT, ALKPHOS, PROT, ALBUMIN, BILITOT, EGFR  VTE Pharmacologic Prophylaxis: Sequential compression device (Venodyne)   VTE Mechanical Prophylaxis: sequential compression device

## 2018-02-12 VITALS
TEMPERATURE: 97.9 F | OXYGEN SATURATION: 97 % | WEIGHT: 246.91 LBS | SYSTOLIC BLOOD PRESSURE: 137 MMHG | DIASTOLIC BLOOD PRESSURE: 77 MMHG | RESPIRATION RATE: 18 BRPM | BODY MASS INDEX: 36.57 KG/M2 | HEIGHT: 69 IN | HEART RATE: 64 BPM

## 2018-02-12 PROCEDURE — 99024 POSTOP FOLLOW-UP VISIT: CPT | Performed by: SURGERY

## 2018-02-12 RX ORDER — HYDROCODONE BITARTRATE AND ACETAMINOPHEN 5; 325 MG/1; MG/1
1 TABLET ORAL EVERY 6 HOURS PRN
Qty: 10 TABLET | Refills: 0 | Status: SHIPPED | OUTPATIENT
Start: 2018-02-12 | End: 2018-02-22

## 2018-02-12 RX ADMIN — ENOXAPARIN SODIUM 40 MG: 40 INJECTION SUBCUTANEOUS at 09:18

## 2018-02-12 RX ADMIN — HYDROCODONE BITARTRATE AND ACETAMINOPHEN 1 TABLET: 5; 325 TABLET ORAL at 09:19

## 2018-02-12 NOTE — DISCHARGE SUMMARY
Discharge Summary -   [facility] 34 y o  male MRN: [id number]  Unit/Bed#: -01 Encounter: [id number]    Admission Date: 2/8/2018     Discharge Date: 02/12/2018    Admitting Diagnosis: Leukocytosis [D72 829]  Nausea [R11 0]  Abdominal pain [P42 7]  Periumbilical abdominal pain [R10 33]  Internal hernia [K45 8]  Partial small bowel obstruction [K56 600]    Discharge Diagnosis: small bowel obstruction    Attending: Dr Harpreet Bowden Physician(s):     Procedures Performed: Exploratory laparotomy 02/08/2018    Pathology: no abnormal pathology identified    Hospital Course: This 34 y  O  Male was admitted on 02/08/2018 with abdominal pain and CT findings of possible internal hernia with SBO  He was taken to the OR for exploratory laparotomy the same day with negative findings for pathology  Postoperatively he gradually resumed activity and bowel function and was discharged to home on 02/12/2018 with instructions and an appointment to see Dr Naida Woodson in the office in f/u  Condition at Discharge: good     Discharge instructions/Information to patient and family:   See after visit summary for information provided to patient and family  Provisions for Follow-Up Care:  See after visit summary for information related to follow-up care and any pertinent home health orders  Disposition: Home    Planned Readmission: No    Discharge Statement   I spent 35 minutes discharging the patient  This time was spent on the day of discharge  I had direct contact with the patient on the day of discharge  Additional documentation is required if more than 30 minutes were spent on discharge  Discharge Medications:  See after visit summary for reconciled discharge medications provided to patient and family

## 2018-02-12 NOTE — PROGRESS NOTES
Progress Note - General Surgery   Nichelle Finn 34 y o  male MRN: [id number]  Unit/Bed#: -01 Encounter: [id number]    Assessment:  POD#4 exploratory laparotomy for SBO  Tolerating diet and + BM    Plan:  Discharge today with instructions and office f/u with Dr Mellissa Dunaway    Subjective/Objective     Subjective: feeling better, ambulating  Tolerating soft diet    Objective: + BM and passing flatus, incision dry and intact  Abdomen non tender with normal BS  Lungs-CTA bilat  Blood pressure 137/77, pulse 64, temperature 97 9 °F (36 6 °C), temperature source Oral, resp  rate 18, height 5' 9" (1 753 m), weight 112 kg (246 lb 14 6 oz), SpO2 97 %  ,Body mass index is 36 46 kg/m²  Intake/Output Summary (Last 24 hours) at 02/12/18 1009  Last data filed at 02/11/18 2000   Gross per 24 hour   Intake              480 ml   Output                0 ml   Net              480 ml       Invasive Devices     Peripheral Intravenous Line            Peripheral IV 02/08/18 Right Hand 3 days                  Lab, Imaging and other studies:I have personally reviewed pertinent lab results    , CBC: No results found for: WBC, HGB, HCT, MCV, PLT, ADJUSTEDWBC, MCH, MCHC, RDW, MPV, NRBC  VTE Pharmacologic Prophylaxis: Enoxaparin (Lovenox)  VTE Mechanical Prophylaxis: sequential compression device

## 2018-02-12 NOTE — DISCHARGE INSTRUCTIONS
Leukocytosis   WHAT YOU NEED TO KNOW:   Leukocytosis is a condition that causes you to have too many white blood cells (WBC)  WBCs are part of your immune system and help fight infections and diseases  DISCHARGE INSTRUCTIONS:   Medicines:   · Medicines  may be given to decrease inflammation or treat an infection  You may also be given medicine to decrease acid levels in your body or urine  · Take your medicine as directed  Contact your healthcare provider if you think your medicine is not helping or if you have side effects  Tell him of her if you are allergic to any medicine  Keep a list of the medicines, vitamins, and herbs you take  Include the amounts, and when and why you take them  Bring the list or the pill bottles to follow-up visits  Carry your medicine list with you in case of an emergency  Follow up with your healthcare provider as directed: You may need more tests or treatment  You may also be referred to a specialist  Write down your questions so you remember to ask them during your visits  Do not smoke: If you smoke, it is never too late to quit  Ask for information about how to stop smoking if you need help  Contact your healthcare provider or specialist if:   · You have a fever  · You bruise or bleed easily  · You are nauseated, lose weight without trying, or have a poor appetite  · You feel weak, tired, or sick  · You are a man and you have a painful erection that lasts longer than usual      · You have questions or concerns about your condition or care  Return to the emergency department if:   · You have any of the following signs of a stroke:     ¨ Part of your face droops or is numb    ¨ Weakness in an arm or leg    ¨ Confusion or difficulty speaking    ¨ Dizziness, a severe headache, or vision loss    · You have chest pain or trouble breathing  · You have bleeding that does not stop  · You have new pain or tingling in your arms, legs, or abdomen      · You have sudden back pain  © 2017 [facility] Information is for End User's use only and may not be sold, redistributed or otherwise used for commercial purposes  All illustrations and images included in CareNotes® are the copyrighted property of A D A M [employer]  or Landon Chu  The above information is an  only  It is not intended as medical advice for individual conditions or treatments  Talk to your doctor, nurse or pharmacist before following any medical regimen to see if it is safe and effective for you

## 2018-02-12 NOTE — CASE MANAGEMENT
Notification of Discharge  This is a Notification of Discharge from our facility [address]  Please be advised that this patient has been discharge from our facility  Below you will find the admission and discharge date and time including the patients disposition  PRESENTATION DATE: 2/8/2018  9:39 AM  IP ADMISSION DATE: 2/8/18 1055  DISCHARGE DATE: 2/12/2018 11:43 AM  DISPOSITION: Home/Self Care    [facility] in the [facility] by Landon Chu for 2017  Network Utilization Review Department  Phone: [phone]; Fax [phone]  ATTENTION: The Network Utilization Review Department is now centralized for our 7 Facilities  Make a note that we have a new phone and fax numbers for our Department  Please call with any questions or concerns to [phone] and carefully follow the prompts so that you are directed to the right person  All voicemails are confidential  Fax any determinations, approvals, denials, and requests for initial or continue stay review clinical to [phone]  Due to HIGH CALL volume, it would be easier if you could please send faxed requests to expedite your requests and in part, help us provide discharge notifications faster

## 2018-02-12 NOTE — PLAN OF CARE
DISCHARGE PLANNING     Discharge to home or other facility with appropriate resources Completed        GASTROINTESTINAL - ADULT     Minimal or absence of nausea and/or vomiting Completed     Maintains or returns to baseline bowel function Completed     Maintains adequate nutritional intake Completed        INFECTION - ADULT     Absence or prevention of progression during hospitalization Completed     Absence of fever/infection during neutropenic period Completed        Knowledge Deficit     Patient/family/caregiver demonstrates understanding of disease process, treatment plan, medications, and discharge instructions Completed        PAIN - ADULT     Verbalizes/displays adequate comfort level or baseline comfort level Completed        Potential for Falls     Patient will remain free of falls Completed        SAFETY ADULT     Patient will remain free of falls Completed     Maintain or return to baseline ADL function Completed     Maintain or return mobility status to optimal level Completed

## 2018-02-12 NOTE — DISCHARGE SUMMARY
Discharge Summary -   Trino Ford 34 y o  male MRN: [id number]  Unit/Bed#: -01 Encounter: [id number]    Admission Date: 2/8/2018     Discharge Date: 02/12/2018    Admitting Diagnosis: Leukocytosis [D72 829]  Nausea [R11 0]  Abdominal pain [S07 4]  Periumbilical abdominal pain [R10 33]  Internal hernia [K45 8]  Partial small bowel obstruction [K56 600]    Discharge Diagnosis: small bowel obstruction    Attending: Dr Joey Cohen Physician(s):     Procedures Performed: Exploratory laparotomy 02/08/2018    Pathology: no abnormal pathology identified    Hospital Course: This 34 y  O  Male was admitted on 02/08/2018 with abdominal pain and CT findings of possible internal hernia with SBO  He was taken to the OR for exploratory laparotomy the same day with negative findings for pathology  Postoperatively he gradually resumed activity and bowel function and was discharged to home on 02/12/2018 with instructions and an appointment to see Dr Estephania Ibarra in the office in f/u  Condition at Discharge: good     Discharge instructions/Information to patient and family:   See after visit summary for information provided to patient and family  Provisions for Follow-Up Care:  See after visit summary for information related to follow-up care and any pertinent home health orders  Disposition: Home    Planned Readmission: No    Discharge Statement   I spent 35 minutes discharging the patient  This time was spent on the day of discharge  I had direct contact with the patient on the day of discharge  Additional documentation is required if more than 30 minutes were spent on discharge  Discharge Medications:  See after visit summary for reconciled discharge medications provided to patient and family

## 2018-02-27 ENCOUNTER — OFFICE VISIT (OUTPATIENT)
Dept: SURGERY | Facility: CLINIC | Age: 33
End: 2018-02-27

## 2018-02-27 VITALS
HEART RATE: 78 BPM | SYSTOLIC BLOOD PRESSURE: 110 MMHG | RESPIRATION RATE: 13 BRPM | DIASTOLIC BLOOD PRESSURE: 76 MMHG | HEIGHT: 71 IN | WEIGHT: 234.04 LBS | TEMPERATURE: 99.2 F | BODY MASS INDEX: 32.77 KG/M2

## 2018-02-27 DIAGNOSIS — Z48.89 POSTOPERATIVE VISIT: Primary | ICD-10-CM

## 2018-02-27 PROCEDURE — 99024 POSTOP FOLLOW-UP VISIT: CPT | Performed by: SURGERY

## 2018-02-27 NOTE — PROGRESS NOTES
Assessment:  Status post exploratory laparotomy for mechanical small bowel obstruction secondary to internal hernia, clinically improved  Plan:  The patient will return to my office in 1 month for routine follow-up      HPI:  The patient stated doing well, tolerating diet having regular bowel movement  He denies having any chills, fever, nausea, vomiting, diarrhea, constipation or urinary symptoms  Physical exam:  On examination, the abdomen is soft, nondistended and nontender  Incision is healing well without evidence of infection or incisional hernia

## 2018-03-07 NOTE — PROGRESS NOTES
History of Present Illness    Revaccination    Spoke with patient regarding vaccine out of temperature range  Action(s): Revaccination declined  Pt contacted and will call back  Pt called (attempt 1): [phone] [facility]   [phone]  Letter Sent (Regular and Certified): [phone] danica  Other Information: [phone] dm - left message for patient to return my call regarding revac  [phone] danica - Wife reutned the call  She will discuss this with her  and have him call the office to schedule  Tita Landry  [phone] [facility] patient  He stated that he has moved out of the area and he does not wish to [facility]  If he sustains an injury, he will address this with his new PCP  Letter and VIS sheet will be mailed to patient's home address  Tita Landry  [phone] danica - Certified letter returned unable to deliver  See scanned in copy  danica  Active Problems    1  Anxiety (300 00) (F41 9)   2  Benign essential hypertension (401 1) (I10)   3  Depression with anxiety (300 4) (F41 8)   4  Enlarged tonsils (474 11) (J35 1)   5  Hypercholesterolemia (272 0) (E78 00)   6  Need for revaccination (V05 9) (Z23)   7  Need for Tdap vaccination (V06 1) (Z23)   8  Numbness of upper extremity (782 0) (R20 0)   9  Snoring (786 09) (R06 83)   10  Sore throat (462) (J02 9)    Immunizations  Influenza --- Kieran Tran: 25-Nov-2014; Negro Record: 01-Nov-2015   Tdap --- Kieran Tran: Temporarily Deferred: Pt requests deferral, As of: [id number], Defer for 1 Years; Series2: 16-Sep-2015     Current Meds   1  Amlodipine Besylate-Valsartan 5-320 MG Oral Tablet; Take 1 tablet daily   2  Daily Multiple Vitamins Oral Tablet   3  Fluticasone Propionate 50 MCG/ACT Nasal Suspension; USE 1 SPRAY IN EACH   NOSTRIL ONCE DAILY   4   Rosuvastatin Calcium 10 MG Oral Tablet; TAKE 1 TABLET AT BEDTIME   5  SM 12 Hour Sinus Decongestant 120 MG Oral Tablet Extended Release 12 Hour; TAKE 1   TABLET EVERY 12 HOURS AS NEEDED   6  ZyrTEC Allergy 10 MG Oral Tablet; TAKE 1 TABLET DAILY AS DIRECTED    Allergies    1  No Known Drug Allergies    2   Seasonal    Signatures   Electronically signed by : Mart Couch MD; Apr 15 2017 10:03PM EST                       (Validation)

## 2018-03-16 ENCOUNTER — TELEPHONE (OUTPATIENT)
Dept: SURGERY | Facility: CLINIC | Age: 33
End: 2018-03-16

## 2018-04-16 ENCOUNTER — OFFICE VISIT (OUTPATIENT)
Dept: URGENT CARE | Facility: CLINIC | Age: 33
End: 2018-04-16
Payer: COMMERCIAL

## 2018-04-16 VITALS
TEMPERATURE: 98.6 F | BODY MASS INDEX: 33.58 KG/M2 | DIASTOLIC BLOOD PRESSURE: 75 MMHG | RESPIRATION RATE: 16 BRPM | OXYGEN SATURATION: 95 % | HEIGHT: 70 IN | SYSTOLIC BLOOD PRESSURE: 128 MMHG | WEIGHT: 234.6 LBS | HEART RATE: 76 BPM

## 2018-04-16 DIAGNOSIS — J20.9 ACUTE BRONCHITIS, UNSPECIFIED ORGANISM: Primary | ICD-10-CM

## 2018-04-16 PROCEDURE — G0382 LEV 3 HOSP TYPE B ED VISIT: HCPCS | Performed by: PHYSICIAN ASSISTANT

## 2018-04-16 RX ORDER — AZITHROMYCIN 250 MG/1
TABLET, FILM COATED ORAL
Qty: 6 TABLET | Refills: 0 | Status: SHIPPED | OUTPATIENT
Start: 2018-04-16 | End: 2018-04-21

## 2018-04-16 RX ORDER — ALBUTEROL SULFATE 90 UG/1
2 AEROSOL, METERED RESPIRATORY (INHALATION) EVERY 6 HOURS PRN
Qty: 8 G | Refills: 0 | Status: SHIPPED | OUTPATIENT
Start: 2018-04-16

## 2018-04-16 RX ORDER — DEXTROMETHORPHAN HYDROBROMIDE AND PROMETHAZINE HYDROCHLORIDE 15; 6.25 MG/5ML; MG/5ML
5 SYRUP ORAL 4 TIMES DAILY PRN
Qty: 118 ML | Refills: 0 | Status: SHIPPED | OUTPATIENT
Start: 2018-04-16

## 2018-04-16 NOTE — PATIENT INSTRUCTIONS
Can use coricidin HBP for congestion  Follow up with PCP in 3-5 days  Proceed to  ER if symptoms worsen

## 2018-04-16 NOTE — PROGRESS NOTES
[facility]        NAME: Luna Hopkins is a 34 y o  male  : 1985    MRN: [id number]  DATE: 2018  TIME: 9:22 AM    Assessment and Plan   Acute bronchitis, unspecified organism [J20 9]  1  Acute bronchitis, unspecified organism  azithromycin (ZITHROMAX) 250 mg tablet    promethazine-dextromethorphan (PHENERGAN-DM) 6 25-15 mg/5 mL oral syrup    albuterol (PROVENTIL HFA,VENTOLIN HFA) 90 mcg/act inhaler         Patient Instructions     Can use coricidin HBP for congestion  Follow up with PCP in 3-5 days  Proceed to  ER if symptoms worsen  Chief Complaint     Chief Complaint   Patient presents with    Cough     started 1 week ago    Cold Like Symptoms     with sinus pressure  started 1 week ago    Fever    Chills    Headache         History of Present Illness         34year-old male complains of cough, congestion, sinus pain for 1 week  He has a history of seasonal allergies  Using over-the-counter DayQuil and NyQuil  No fever at home  He has sinus pain when he leans over  Some chest pain with deep breath  No shortness of breath          Review of Systems   Review of Systems      Current Medications       Current Outpatient Prescriptions:     amLODIPine-valsartan (EXFORGE) 5-320 MG per tablet, Take 1 tablet by mouth daily, Disp: , Rfl:     Cetirizine HCl (ZYRTEC ALLERGY) 10 MG CAPS, Take 1 tablet by mouth daily, Disp: , Rfl:     escitalopram (LEXAPRO) 10 mg tablet, Take 1 tablet (10 mg total) by mouth daily 2 tablets daily, Disp: 90 tablet, Rfl: 1    fluticasone (FLONASE) 50 mcg/act nasal spray, 1 spray into each nostril daily, Disp: , Rfl:     MULTIPLE VITAMINS PO, Take by mouth, Disp: , Rfl:     pseudoephedrine (SUDAFED) 120 MG 12 hr tablet, Take 1 tablet by mouth every 12 (twelve) hours as needed, Disp: , Rfl:     albuterol (PROVENTIL HFA,VENTOLIN HFA) 90 mcg/act inhaler, Inhale 2 puffs every 6 (six) hours as needed for wheezing, Disp: 8 g, Rfl: 0    azithromycin (ZITHROMAX) 250 mg tablet, 2 tabs on day 1, then 1 tab daily for 4 days, Disp: 6 tablet, Rfl: 0    promethazine-dextromethorphan (PHENERGAN-DM) 6 25-15 mg/5 mL oral syrup, Take 5 mL by mouth 4 (four) times a day as needed for cough, Disp: 118 mL, Rfl: 0    rosuvastatin (CRESTOR) 10 MG tablet, Take 1 tablet by mouth, Disp: , Rfl:     Current Allergies     Allergies as of 04/16/2018    (No Known Allergies)            The following portions of the patient's history were reviewed and updated as appropriate: allergies, current medications, past family history, past medical history, past social history, past surgical history and problem list      Past Medical History:   Diagnosis Date    Depression     Hyperlipidemia     Hypertension        Past Surgical History:   Procedure Laterality Date    LAPAROSCOPY N/A 2/8/2018    Procedure: EXPLORATORY LAPAROTOMY;  Surgeon: Dustin Prather MD;  [facility];  Service: General    LEG SURGERY         Family History   Problem Relation Age of Onset    Cancer Mother     Dementia Father          Medications have been verified  Objective   /75   Pulse 76   Temp 98 6 °F (37 °C) (Tympanic)   Resp 16   Ht 5' 10" (1 778 m)   Wt 106 kg (234 lb 9 6 oz)   SpO2 95%   BMI 33 66 kg/m²        Physical Exam     Physical Exam   Constitutional: He appears well-developed and well-nourished  No distress  HENT:   Right Ear: Tympanic membrane, external ear and ear canal normal    Left Ear: Tympanic membrane, external ear and ear canal normal    Nose: Mucosal edema and rhinorrhea present  Right sinus exhibits frontal sinus tenderness  Right sinus exhibits no maxillary sinus tenderness  Left sinus exhibits frontal sinus tenderness  Left sinus exhibits no maxillary sinus tenderness  Mouth/Throat: Oropharynx is clear and moist  No posterior oropharyngeal erythema  Eyes: Conjunctivae and EOM are normal  Pupils are equal, round, and reactive to light   No scleral icterus  Neck: Normal range of motion  Neck supple  Cardiovascular: Normal rate, regular rhythm and normal heart sounds  Pulmonary/Chest: Effort normal  No accessory muscle usage  No respiratory distress  He has no wheezes  He has rhonchi in the left middle field  He has no rales  Abdominal: Soft  Bowel sounds are normal  He exhibits no distension and no mass  There is no tenderness  There is no rebound and no guarding  Lymphadenopathy:     He has no cervical adenopathy  Skin: Skin is warm and dry  No rash noted

## 2018-08-19 DIAGNOSIS — F32.A DEPRESSION, UNSPECIFIED DEPRESSION TYPE: ICD-10-CM

## 2018-08-19 DIAGNOSIS — I10 ESSENTIAL HYPERTENSION: Primary | ICD-10-CM

## 2018-08-19 RX ORDER — ESCITALOPRAM OXALATE 10 MG/1
TABLET ORAL
Qty: 180 TABLET | Refills: 0 | Status: SHIPPED | OUTPATIENT
Start: 2018-08-19 | End: 2018-12-05 | Stop reason: SDUPTHER

## 2018-08-19 RX ORDER — AMLODIPINE AND VALSARTAN 5; 320 MG/1; MG/1
TABLET ORAL
Qty: 30 TABLET | Refills: 3 | Status: SHIPPED | OUTPATIENT
Start: 2018-08-19 | End: 2019-01-19 | Stop reason: SDUPTHER

## 2018-12-05 DIAGNOSIS — F32.A DEPRESSION, UNSPECIFIED DEPRESSION TYPE: ICD-10-CM

## 2018-12-05 RX ORDER — ESCITALOPRAM OXALATE 10 MG/1
TABLET ORAL
Qty: 180 TABLET | Refills: 0 | Status: SHIPPED | OUTPATIENT
Start: 2018-12-05

## 2019-01-19 DIAGNOSIS — I10 ESSENTIAL HYPERTENSION: ICD-10-CM

## 2019-01-21 RX ORDER — AMLODIPINE AND VALSARTAN 5; 320 MG/1; MG/1
TABLET ORAL
Qty: 30 TABLET | Refills: 3 | Status: SHIPPED | OUTPATIENT
Start: 2019-01-21

## 2020-03-25 DIAGNOSIS — I10 ESSENTIAL HYPERTENSION: ICD-10-CM

## 2020-03-25 RX ORDER — AMLODIPINE AND VALSARTAN 5; 320 MG/1; MG/1
TABLET ORAL
Qty: 90 TABLET | Refills: 0 | OUTPATIENT
Start: 2020-03-25

## 2023-09-23 NOTE — PLAN OF CARE
DISCHARGE PLANNING     Discharge to home or other facility with appropriate resources Progressing        GASTROINTESTINAL - ADULT     Minimal or absence of nausea and/or vomiting Progressing     Maintains or returns to baseline bowel function Progressing     Maintains adequate nutritional intake Progressing        INFECTION - ADULT     Absence or prevention of progression during hospitalization Progressing     Absence of fever/infection during neutropenic period Progressing        Knowledge Deficit     Patient/family/caregiver demonstrates understanding of disease process, treatment plan, medications, and discharge instructions Progressing        PAIN - ADULT     Verbalizes/displays adequate comfort level or baseline comfort level Progressing        SAFETY ADULT     Patient will remain free of falls Progressing     Maintain or return to baseline ADL function Progressing     Maintain or return mobility status to optimal level Progressing Benefits, risks, and possible complications of procedure explained to patient/caregiver who verbalized understanding and gave verbal consent.

## (undated) DEVICE — CHLORAPREP HI-LITE 26ML ORANGE

## (undated) DEVICE — STERILE MAJOR GENERAL PACK: Brand: CARDINAL HEALTH

## (undated) DEVICE — GAUZE SPONGES,16 PLY: Brand: CURITY

## (undated) DEVICE — 3M™ STERI-STRIP™ REINFORCED ADHESIVE SKIN CLOSURES, R1546, 1/4 IN X 4 IN (6 MM X 100 MM), 10 STRIPS/ENVELOPE: Brand: 3M™ STERI-STRIP™

## (undated) DEVICE — 3M™ TEGADERM™ TRANSPARENT FILM DRESSING FRAME STYLE, 1628, 6 IN X 8 IN (15 CM X 20 CM), 10/CT 8CT/CASE: Brand: 3M™ TEGADERM™

## (undated) DEVICE — INTENDED FOR TISSUE SEPARATION, AND OTHER PROCEDURES THAT REQUIRE A SHARP SURGICAL BLADE TO PUNCTURE OR CUT.: Brand: BARD-PARKER SAFETY BLADES SIZE 15, STERILE

## (undated) DEVICE — INTENDED FOR TISSUE SEPARATION, AND OTHER PROCEDURES THAT REQUIRE A SHARP SURGICAL BLADE TO PUNCTURE OR CUT.: Brand: BARD-PARKER SAFETY BLADES SIZE 10, STERILE

## (undated) DEVICE — POOLE SUCTION HANDLE: Brand: CARDINAL HEALTH

## (undated) DEVICE — INTERCEED

## (undated) DEVICE — GLOVE SRG BIOGEL ECLIPSE 7.5

## (undated) DEVICE — TRAY FOLEY 16FR URIMETER SURESTEP

## (undated) DEVICE — TOWEL SET X-RAY

## (undated) DEVICE — REM POLYHESIVE ADULT PATIENT RETURN ELECTRODE: Brand: VALLEYLAB

## (undated) DEVICE — LIGHT HANDLE COVER SLEEVE DISP BLUE STELLAR